# Patient Record
Sex: MALE | Race: WHITE
[De-identification: names, ages, dates, MRNs, and addresses within clinical notes are randomized per-mention and may not be internally consistent; named-entity substitution may affect disease eponyms.]

---

## 2017-12-23 ENCOUNTER — HOSPITAL ENCOUNTER (INPATIENT)
Dept: HOSPITAL 17 - HIME | Age: 82
LOS: 4 days | Discharge: NURSING, REHAB OR LONG-TERM CARE | DRG: 312 | End: 2017-12-27
Attending: HOSPITALIST | Admitting: HOSPITALIST
Payer: MEDICARE

## 2017-12-23 VITALS
HEART RATE: 74 BPM | SYSTOLIC BLOOD PRESSURE: 106 MMHG | RESPIRATION RATE: 13 BRPM | OXYGEN SATURATION: 97 % | DIASTOLIC BLOOD PRESSURE: 54 MMHG

## 2017-12-23 VITALS
DIASTOLIC BLOOD PRESSURE: 69 MMHG | SYSTOLIC BLOOD PRESSURE: 144 MMHG | HEART RATE: 82 BPM | RESPIRATION RATE: 24 BRPM | OXYGEN SATURATION: 83 %

## 2017-12-23 VITALS — OXYGEN SATURATION: 97 %

## 2017-12-23 VITALS — HEART RATE: 83 BPM

## 2017-12-23 VITALS — WEIGHT: 230.82 LBS | BODY MASS INDEX: 33.05 KG/M2 | HEIGHT: 70 IN

## 2017-12-23 VITALS
DIASTOLIC BLOOD PRESSURE: 59 MMHG | OXYGEN SATURATION: 95 % | SYSTOLIC BLOOD PRESSURE: 130 MMHG | RESPIRATION RATE: 12 BRPM | HEART RATE: 68 BPM | TEMPERATURE: 97.8 F

## 2017-12-23 VITALS
HEART RATE: 72 BPM | SYSTOLIC BLOOD PRESSURE: 135 MMHG | OXYGEN SATURATION: 100 % | RESPIRATION RATE: 16 BRPM | DIASTOLIC BLOOD PRESSURE: 62 MMHG

## 2017-12-23 VITALS
DIASTOLIC BLOOD PRESSURE: 57 MMHG | HEART RATE: 82 BPM | RESPIRATION RATE: 16 BRPM | SYSTOLIC BLOOD PRESSURE: 118 MMHG | OXYGEN SATURATION: 96 % | TEMPERATURE: 99.2 F

## 2017-12-23 VITALS
SYSTOLIC BLOOD PRESSURE: 139 MMHG | HEART RATE: 77 BPM | DIASTOLIC BLOOD PRESSURE: 74 MMHG | OXYGEN SATURATION: 100 % | RESPIRATION RATE: 15 BRPM

## 2017-12-23 VITALS
SYSTOLIC BLOOD PRESSURE: 106 MMHG | RESPIRATION RATE: 16 BRPM | OXYGEN SATURATION: 100 % | DIASTOLIC BLOOD PRESSURE: 54 MMHG | HEART RATE: 78 BPM

## 2017-12-23 VITALS
HEART RATE: 72 BPM | DIASTOLIC BLOOD PRESSURE: 66 MMHG | RESPIRATION RATE: 10 BRPM | SYSTOLIC BLOOD PRESSURE: 156 MMHG | OXYGEN SATURATION: 100 %

## 2017-12-23 VITALS
OXYGEN SATURATION: 92 % | DIASTOLIC BLOOD PRESSURE: 59 MMHG | SYSTOLIC BLOOD PRESSURE: 120 MMHG | RESPIRATION RATE: 13 BRPM | HEART RATE: 72 BPM

## 2017-12-23 VITALS
DIASTOLIC BLOOD PRESSURE: 74 MMHG | RESPIRATION RATE: 15 BRPM | OXYGEN SATURATION: 100 % | SYSTOLIC BLOOD PRESSURE: 162 MMHG | HEART RATE: 77 BPM

## 2017-12-23 VITALS — OXYGEN SATURATION: 99 %

## 2017-12-23 DIAGNOSIS — E03.9: ICD-10-CM

## 2017-12-23 DIAGNOSIS — I10: ICD-10-CM

## 2017-12-23 DIAGNOSIS — R53.1: ICD-10-CM

## 2017-12-23 DIAGNOSIS — Z79.02: ICD-10-CM

## 2017-12-23 DIAGNOSIS — E11.9: ICD-10-CM

## 2017-12-23 DIAGNOSIS — R29.6: ICD-10-CM

## 2017-12-23 DIAGNOSIS — R05: ICD-10-CM

## 2017-12-23 DIAGNOSIS — I49.3: ICD-10-CM

## 2017-12-23 DIAGNOSIS — M06.9: ICD-10-CM

## 2017-12-23 DIAGNOSIS — E78.5: ICD-10-CM

## 2017-12-23 DIAGNOSIS — Z79.4: ICD-10-CM

## 2017-12-23 DIAGNOSIS — Z87.891: ICD-10-CM

## 2017-12-23 DIAGNOSIS — Z95.1: ICD-10-CM

## 2017-12-23 DIAGNOSIS — I25.10: ICD-10-CM

## 2017-12-23 DIAGNOSIS — Z79.82: ICD-10-CM

## 2017-12-23 DIAGNOSIS — Z85.46: ICD-10-CM

## 2017-12-23 DIAGNOSIS — R55: Primary | ICD-10-CM

## 2017-12-23 LAB
ALBUMIN SERPL-MCNC: 2.6 GM/DL (ref 3.4–5)
ALP SERPL-CCNC: 84 U/L (ref 45–117)
ALT SERPL-CCNC: 64 U/L (ref 12–78)
AST SERPL-CCNC: 76 U/L (ref 15–37)
BASOPHILS # BLD AUTO: 0 TH/MM3 (ref 0–0.2)
BASOPHILS NFR BLD: 0.3 % (ref 0–2)
BILIRUB SERPL-MCNC: 0.7 MG/DL (ref 0.2–1)
BUN SERPL-MCNC: 22 MG/DL (ref 7–18)
CALCIUM SERPL-MCNC: 7.9 MG/DL (ref 8.5–10.1)
CHLORIDE SERPL-SCNC: 103 MEQ/L (ref 98–107)
CREAT SERPL-MCNC: 1.24 MG/DL (ref 0.6–1.3)
EOSINOPHIL # BLD: 0.1 TH/MM3 (ref 0–0.4)
EOSINOPHIL NFR BLD: 2.2 % (ref 0–4)
ERYTHROCYTE [DISTWIDTH] IN BLOOD BY AUTOMATED COUNT: 16.6 % (ref 11.6–17.2)
GFR SERPLBLD BASED ON 1.73 SQ M-ARVRAT: 55 ML/MIN (ref 89–?)
GLUCOSE SERPL-MCNC: 178 MG/DL (ref 74–106)
HCO3 BLD-SCNC: 30.1 MEQ/L (ref 21–32)
HCT VFR BLD CALC: 30.5 % (ref 39–51)
HGB BLD-MCNC: 10.4 GM/DL (ref 13–17)
INR PPP: 1.1 RATIO
LYMPHOCYTES # BLD AUTO: 0.9 TH/MM3 (ref 1–4.8)
LYMPHOCYTES NFR BLD AUTO: 13.6 % (ref 9–44)
MAGNESIUM SERPL-MCNC: 2 MG/DL (ref 1.5–2.5)
MCH RBC QN AUTO: 31.5 PG (ref 27–34)
MCHC RBC AUTO-ENTMCNC: 34.1 % (ref 32–36)
MCV RBC AUTO: 92.3 FL (ref 80–100)
MONOCYTE #: 0.2 TH/MM3 (ref 0–0.9)
MONOCYTES NFR BLD: 3.3 % (ref 0–8)
NEUTROPHILS # BLD AUTO: 5.4 TH/MM3 (ref 1.8–7.7)
NEUTROPHILS NFR BLD AUTO: 80.6 % (ref 16–70)
PHOSPHATE SERPL-MCNC: 1.6 MG/DL (ref 2.5–4.9)
PLATELET # BLD: 146 TH/MM3 (ref 150–450)
PMV BLD AUTO: 8 FL (ref 7–11)
PROT SERPL-MCNC: 6 GM/DL (ref 6.4–8.2)
PROTHROMBIN TIME: 10.9 SEC (ref 9.8–11.6)
RBC # BLD AUTO: 3.31 MIL/MM3 (ref 4.5–5.9)
SODIUM SERPL-SCNC: 139 MEQ/L (ref 136–145)
TROPONIN I SERPL-MCNC: 0.04 NG/ML (ref 0.02–0.05)
WBC # BLD AUTO: 6.7 TH/MM3 (ref 4–11)

## 2017-12-23 PROCEDURE — 87641 MR-STAPH DNA AMP PROBE: CPT

## 2017-12-23 PROCEDURE — 93880 EXTRACRANIAL BILAT STUDY: CPT

## 2017-12-23 PROCEDURE — 85610 PROTHROMBIN TIME: CPT

## 2017-12-23 PROCEDURE — 70450 CT HEAD/BRAIN W/O DYE: CPT

## 2017-12-23 PROCEDURE — 84155 ASSAY OF PROTEIN SERUM: CPT

## 2017-12-23 PROCEDURE — 80048 BASIC METABOLIC PNL TOTAL CA: CPT

## 2017-12-23 PROCEDURE — 94664 DEMO&/EVAL PT USE INHALER: CPT

## 2017-12-23 PROCEDURE — 80053 COMPREHEN METABOLIC PANEL: CPT

## 2017-12-23 PROCEDURE — 93306 TTE W/DOPPLER COMPLETE: CPT

## 2017-12-23 PROCEDURE — 93005 ELECTROCARDIOGRAM TRACING: CPT

## 2017-12-23 PROCEDURE — 71010: CPT

## 2017-12-23 PROCEDURE — 84443 ASSAY THYROID STIM HORMONE: CPT

## 2017-12-23 PROCEDURE — 82550 ASSAY OF CK (CPK): CPT

## 2017-12-23 PROCEDURE — 82948 REAGENT STRIP/BLOOD GLUCOSE: CPT

## 2017-12-23 PROCEDURE — 85025 COMPLETE CBC W/AUTO DIFF WBC: CPT

## 2017-12-23 PROCEDURE — 84484 ASSAY OF TROPONIN QUANT: CPT

## 2017-12-23 PROCEDURE — 94640 AIRWAY INHALATION TREATMENT: CPT

## 2017-12-23 PROCEDURE — 84100 ASSAY OF PHOSPHORUS: CPT

## 2017-12-23 PROCEDURE — 83735 ASSAY OF MAGNESIUM: CPT

## 2017-12-23 RX ADMIN — HUMAN INSULIN SCH: 100 INJECTION, SOLUTION SUBCUTANEOUS at 12:00

## 2017-12-23 RX ADMIN — PHENYTOIN SODIUM SCH MLS/HR: 50 INJECTION INTRAMUSCULAR; INTRAVENOUS at 11:30

## 2017-12-23 RX ADMIN — IPRATROPIUM BROMIDE AND ALBUTEROL SULFATE SCH AMPULE: .5; 3 SOLUTION RESPIRATORY (INHALATION) at 15:31

## 2017-12-23 RX ADMIN — HEPARIN SODIUM SCH UNITS: 10000 INJECTION, SOLUTION INTRAVENOUS; SUBCUTANEOUS at 22:15

## 2017-12-23 RX ADMIN — STANDARDIZED SENNA CONCENTRATE AND DOCUSATE SODIUM SCH TAB: 8.6; 5 TABLET, FILM COATED ORAL at 22:14

## 2017-12-23 RX ADMIN — FAMOTIDINE SCH MG: 20 TABLET, FILM COATED ORAL at 22:14

## 2017-12-23 RX ADMIN — HUMAN INSULIN SCH: 100 INJECTION, SOLUTION SUBCUTANEOUS at 20:00

## 2017-12-23 RX ADMIN — IPRATROPIUM BROMIDE AND ALBUTEROL SULFATE SCH AMPULE: .5; 3 SOLUTION RESPIRATORY (INHALATION) at 11:15

## 2017-12-23 RX ADMIN — IPRATROPIUM BROMIDE AND ALBUTEROL SULFATE SCH AMPULE: .5; 3 SOLUTION RESPIRATORY (INHALATION) at 19:37

## 2017-12-23 NOTE — RADRPT
EXAM DATE/TIME:  12/23/2017 00:00 

 

[facility] COMPARISON:     

No previous studies available for comparison.

 

                     

INDICATIONS :     

Shortness of breath.

                     

 

MEDICAL HISTORY :     

None.          

 

SURGICAL HISTORY :     

CABG.   

 

ENCOUNTER:     

Initial                                        

 

ACUITY:     

1 day      

 

PAIN SCORE:     

Non-responsive.

 

LOCATION:     

Bilateral chest 

 

FINDINGS:     

Moderate elevation right hemidiaphragm.  Minimal bibasilar probable changes.  Mild compensated cardio
megaly.  History of previous bypass.

 

CONCLUSION:     

Bibasilar parenchymal changes with elevation right hemidiaphragm otherwise negative.

 

 

 

 Thien Talavera MD FACR on December 23, 2017 at 11:49           

Board Certified Radiologist.

 This report was verified electronically.

## 2017-12-23 NOTE — MH
cc:

MOSES HUMPHRIES M.D.

****

 

DATE OF ADMISSION:  12/23/2017

 

HISTORY OF PRESENT ILLNESS:

The patient is an 89-year-old male with past medical history of coronary artery

disease, previous CABG, hypertension, diabetes mellitus, hyperlipidemia,

prostate cancer, hypothyroidism and rheumatoid arthritis.  The patient was

initially hospitalized at [facility] for generalized weakness,

frequent falls and difficulty ambulating.  During his hospital stay, he was

seen by a neurologist and had a CT scan of the brain which showed no acute

intracranial findings. The patient was transferred to [facility] for therapy

yesterday.  According to his medical records, the patient was noted to have

bradycardia at the outside facility.  He also was evaluated by cardiology at

[place] for syncope, which was thought secondary to beta-blocker and it was

discontinued. A HaliCAT was called as the patient looked ashen and had

questionable syncopal episode.  He was placed back in bed where he improved

spontaneously.  The patient was transferred to the intensive care unit for

close observation.

 

When seen, he is on room air oxygen with a pulse of 69, blood pressure 130/59,

saturation 95% on room air. A Code Blue was called in [facility]; however,

the patient did not receive any medications or CPR.

 

PAST MEDICAL HISTORY:

His past medical history is significant for:

1.  Hypertension.

2. Diabetes.

3. Hyperlipidemia.

4. Coronary artery disease.

5. Hypothyroidism.

6. Prostate cancer.

7. Rheumatoid arthritis.

 

PAST SURGICAL HISTORY:

1. Previous kyphoplasty seven or eight years ago.

2. Previous CABG.

3. Previous catheterization.

4. Stent placement x5.

5. Previous abdominal surgery.

 

ALLERGIES:

NO KNOWN DRUG ALLERGIES.

 

SOCIAL HISTORY:

Remote history of tobacco use.

 

CURRENT MEDICATIONS:

1. Aspirin.

2. Plavix.

3. Lipitor.

4. Boniva.

5. Insulin.

6. Synthroid.

 

FAMILY HISTORY:

Noncontributory to the present illness.

 

REVIEW OF SYSTEMS:

The review of systems is as per the history of present illness, and the rest of

the review of systems is unremarkable.

 

PHYSICAL EXAMINATION:

GENERAL:  An 89-year-old male lying in bed in no acute distress on room air

oxygen.

VITAL SIGNS: Afebrile, pulse of 69, blood pressure 138/59,  saturation 95% on

room air.

HEAD, EYES, EARS, NOSE, THROAT: Normocephalic and atraumatic. Pupils equal,

round and reactive to light and accommodation.  Extraocular muscles intact.

Conjunctivae are pink. Nonicteric sclerae. Oral mucosa within normal limits.

NECK: The neck is supple. No jugular venous distention, adenopathy or

thyromegaly. Trachea in the midline.

CARDIOVASCULAR: Regular rate and rhythm. Normal S1-S2. No murmurs, rubs or

gallops noted.

PULMONARY: Bilateral equal air entry. No rales or wheezing.

ABDOMEN: The abdomen is soft, obese, nontender and no distention. Positive

bowel sounds.

EXTREMITIES: No cyanosis, clubbing or edema.

NEUROLOGIC: No focal sensory deficit. Awake and alert.

 

LABORATORY DATA:

None available.

 

IMPRESSION:

1. Status post syncopal episode.

2. Generalized weakness and frequent falls.

3. History of coronary artery disease and CABG.

4. Hypertension.

5. Diabetes mellitus.

6. Hyperlipidemia.

7. Hypothyroidism.

8. History of prostate cancer.

9. Rheumatoid arthritis.

 

RECOMMENDATIONS:

1. Monitor neuro status closely and avoid any sedatives.

2. I will proceed with CT scan of the brain without contrast. In addition, will

   obtain a Doppler ultrasound of the carotid arteries.

3. Monitor heart rate and blood pressure closely and maintain MAP greater than

   65 mmHg.

4. Continue with aspirin 81 milligrams daily and Plavix 75  milligrams daily.

5. Avoid beta blocker or calcium channel blocker as the patient has a history

   of bradycardia, which was thought to be related to a beta blocker at [facility]..

6. Will obtain cardiac enzymes with troponin and EKG.

7. Will check 2D echocardiogram to evaluate left ventricular function and to

   rule out regional wall motion abnormalities.

8. Continue oxygen PRN to maintain saturations above 92%.

9. Bronchodilators in the form of DuoNeb q. 6 plus q. 2 PRN for shortness of

   breath.

10. Monitor  renal function and electrolyte replacement as needed. Place on

   normal saline at 75 mL/hour.

11. Place on Pepcid for  GI prophylaxis. Start Heart healthy diet as ilene.

13. Monitor for signs of infection, which include fever and WBCs. Pan culture

   if spikes fever.  Will check a baseline chest x-ray.

14. Monitor CBC and continue with folic acid 1 milligram daily.

15. Place on sliding scale insulin with Accu-Chek for glycemic control.

16. Resume Synthroid at 25 micrograms daily,  his home medication.

17. Check a baseline TSH level.

18. GI prophylaxis with Pepcid and DVT prophylaxis with SCDs and start heparin

   subcutaneous for DVT prophylaxis if CT brain is negative for acute findings.

 

Case was discussed with the nursing staff and with the patient's son, who

is present at the bedside.

 

 

 

                               __________________________________

                           MD GANGA Vick/KERRIE

D:  12/23/2017/10:45 AM

T:  12/23/2017/10:59 AM

Visit #:  [id number]

Job #:  [id number]

YOSVANY

## 2017-12-23 NOTE — RADRPT
EXAM DATE/TIME:  12/23/2017 14:23 

 

[facility] COMPARISON:     

No previous studies available for comparison.

        

 

INDICATIONS :     Syncope. 

                     

MEDICAL HISTORY :     Stroke. Congestive heart failure. Hypertension. Thyroid disease. Cardiomyopathy
. COPD. Dyspnea. Arthritis. Diabetes. Prostate cancer. 

SURGICAL HISTORY :          Orthopedic surgery. 

ENCOUNTER:     Initial

ACUITY:     1 day

PAIN SCORE:     2/10

LOCATION:     Bilateral neck 

                     

PEAK SYSTOLIC VELOCITIES (cm/sec):

ICA/CCA RATIO:                    Right: 1.1     Left: 1.1

ICA:                          Right: 83.8     Left: 80.6

CCA:                          Right: 76.5     Left: 72.9

ECA:                           Right: 103.2     Left: 123.9

VERTEBRAL:           Right: 54.8 antegrade     Left: 58.0 antegrade

             

Elevated flow velocities and ICA/CCA ratios have been found to correlate with increased degrees of

vessel stenosis, calculated as percentage of diameter relative to a normal segment of distal ICA/CCA

 

FINDINGS:     

RIGHT CAROTID:  There is no evidence for a hemodynamically significant carotid stenosis.  Minimal int
imal hyperplasia is present with scattered calcific plaque.

LEFT CAROTID:  There is no evidence for a hemodynamically significant carotid stenosis.  Minimal inti
mal hyperplasia is present with scattered calcific plaque.

VERTEBRAL ARTERIES: Flow is antegrade in both vertebral arteries.

MISCELLANEOUS: There are no ancillary masses or adenopathy.

 

CONCLUSION:  Negative examination for a hemodynamically significant carotid stenosis.

 

     

Board Certified Radiologist.

 This report was verified electronically.

## 2017-12-23 NOTE — RADRPT
EXAM DATE/TIME:  12/23/2017 11:31 

 

[facility] COMPARISON:     

No previous studies available for comparison.

 

 

INDICATIONS :     

Syncopal episode.

                      

 

RADIATION DOSE:     

32.86 CTDIvol (mGy) moderate motion artifact requiring repeating multiple slices.

 

 

 

MEDICAL HISTORY :     

Stroke. Congestive heart failure. Hypertension.Diabetes, prostate cancer.

 

SURGICAL HISTORY :      

None. 

 

ENCOUNTER:      

Initial

 

ACUITY:      

1 day

 

PAIN SCALE:      

2/10

 

LOCATION:       

Bilateral  head

 

TECHNIQUE:     

Multiple contiguous axial images were obtained of the head.  Using automated exposure control and adj
ustment of the mA and/or kV according to patient size, radiation dose was kept as low as reasonably a
chievable to obtain optimal diagnostic quality images.   DICOM format image data is available electro
nically for review and comparison.  

 

FINDINGS:     

There is marked central and cortical atrophy with dilatation of ventricular and sulcal spaces.  There
 is no parenchymal hemorrhage, acute infarction or mass lesion identified.  There are no extra-axial 
fluid collections appreciated.  The posterior fossa is unremarkable with midline fourth ventricle.  T
he portion of the orbits and paranasal sinuses visualized are unremarkable. 

 

CONCLUSION:     

Moderate motion artifact, atrophy, negative for acute process..

 

 

 

 Thien Talavera MD FACR on December 23, 2017 at 11:45           

Board Certified Radiologist.

 This report was verified electronically.

## 2017-12-24 VITALS
HEART RATE: 62 BPM | DIASTOLIC BLOOD PRESSURE: 65 MMHG | TEMPERATURE: 98.3 F | SYSTOLIC BLOOD PRESSURE: 154 MMHG | OXYGEN SATURATION: 98 % | RESPIRATION RATE: 15 BRPM

## 2017-12-24 VITALS
RESPIRATION RATE: 20 BRPM | DIASTOLIC BLOOD PRESSURE: 89 MMHG | HEART RATE: 93 BPM | SYSTOLIC BLOOD PRESSURE: 134 MMHG | OXYGEN SATURATION: 99 % | TEMPERATURE: 101 F

## 2017-12-24 VITALS
DIASTOLIC BLOOD PRESSURE: 72 MMHG | SYSTOLIC BLOOD PRESSURE: 159 MMHG | OXYGEN SATURATION: 94 % | RESPIRATION RATE: 20 BRPM | HEART RATE: 84 BPM | TEMPERATURE: 98.5 F

## 2017-12-24 VITALS
RESPIRATION RATE: 16 BRPM | HEART RATE: 67 BPM | DIASTOLIC BLOOD PRESSURE: 68 MMHG | OXYGEN SATURATION: 96 % | SYSTOLIC BLOOD PRESSURE: 146 MMHG | TEMPERATURE: 97.8 F

## 2017-12-24 VITALS
OXYGEN SATURATION: 98 % | TEMPERATURE: 98.2 F | RESPIRATION RATE: 22 BRPM | DIASTOLIC BLOOD PRESSURE: 61 MMHG | HEART RATE: 77 BPM | SYSTOLIC BLOOD PRESSURE: 131 MMHG

## 2017-12-24 VITALS
TEMPERATURE: 99 F | OXYGEN SATURATION: 96 % | RESPIRATION RATE: 14 BRPM | SYSTOLIC BLOOD PRESSURE: 135 MMHG | HEART RATE: 89 BPM | DIASTOLIC BLOOD PRESSURE: 65 MMHG

## 2017-12-24 VITALS — HEART RATE: 77 BPM

## 2017-12-24 VITALS — HEART RATE: 89 BPM

## 2017-12-24 VITALS — HEART RATE: 75 BPM

## 2017-12-24 VITALS — HEART RATE: 106 BPM

## 2017-12-24 VITALS — HEART RATE: 67 BPM

## 2017-12-24 VITALS — HEART RATE: 80 BPM

## 2017-12-24 VITALS — OXYGEN SATURATION: 98 %

## 2017-12-24 LAB
BASOPHILS # BLD AUTO: 0 TH/MM3 (ref 0–0.2)
BASOPHILS NFR BLD: 0.3 % (ref 0–2)
BUN SERPL-MCNC: 20 MG/DL (ref 7–18)
CALCIUM SERPL-MCNC: 7.6 MG/DL (ref 8.5–10.1)
CHLORIDE SERPL-SCNC: 105 MEQ/L (ref 98–107)
CREAT SERPL-MCNC: 1.07 MG/DL (ref 0.6–1.3)
EOSINOPHIL # BLD: 0.2 TH/MM3 (ref 0–0.4)
EOSINOPHIL NFR BLD: 4 % (ref 0–4)
ERYTHROCYTE [DISTWIDTH] IN BLOOD BY AUTOMATED COUNT: 17 % (ref 11.6–17.2)
GFR SERPLBLD BASED ON 1.73 SQ M-ARVRAT: 65 ML/MIN (ref 89–?)
GLUCOSE SERPL-MCNC: 148 MG/DL (ref 74–106)
HCO3 BLD-SCNC: 30.5 MEQ/L (ref 21–32)
HCT VFR BLD CALC: 28.4 % (ref 39–51)
HGB BLD-MCNC: 9.3 GM/DL (ref 13–17)
LYMPHOCYTES # BLD AUTO: 1.1 TH/MM3 (ref 1–4.8)
LYMPHOCYTES NFR BLD AUTO: 24.6 % (ref 9–44)
MAGNESIUM SERPL-MCNC: 2 MG/DL (ref 1.5–2.5)
MCH RBC QN AUTO: 30.5 PG (ref 27–34)
MCHC RBC AUTO-ENTMCNC: 32.8 % (ref 32–36)
MCV RBC AUTO: 93 FL (ref 80–100)
MONOCYTE #: 0.2 TH/MM3 (ref 0–0.9)
MONOCYTES NFR BLD: 3.5 % (ref 0–8)
NEUTROPHILS # BLD AUTO: 3.1 TH/MM3 (ref 1.8–7.7)
NEUTROPHILS NFR BLD AUTO: 67.6 % (ref 16–70)
PHOSPHATE SERPL-MCNC: 2.2 MG/DL (ref 2.5–4.9)
PLATELET # BLD: 146 TH/MM3 (ref 150–450)
PMV BLD AUTO: 8.2 FL (ref 7–11)
RBC # BLD AUTO: 3.06 MIL/MM3 (ref 4.5–5.9)
SODIUM SERPL-SCNC: 139 MEQ/L (ref 136–145)
WBC # BLD AUTO: 4.5 TH/MM3 (ref 4–11)

## 2017-12-24 RX ADMIN — HEPARIN SODIUM SCH UNITS: 10000 INJECTION, SOLUTION INTRAVENOUS; SUBCUTANEOUS at 09:20

## 2017-12-24 RX ADMIN — HUMAN INSULIN SCH: 100 INJECTION, SOLUTION SUBCUTANEOUS at 20:56

## 2017-12-24 RX ADMIN — IPRATROPIUM BROMIDE AND ALBUTEROL SULFATE SCH AMPULE: .5; 3 SOLUTION RESPIRATORY (INHALATION) at 03:16

## 2017-12-24 RX ADMIN — IPRATROPIUM BROMIDE AND ALBUTEROL SULFATE SCH AMPULE: .5; 3 SOLUTION RESPIRATORY (INHALATION) at 08:03

## 2017-12-24 RX ADMIN — CHLORHEXIDINE GLUCONATE SCH PACK: 500 CLOTH TOPICAL at 04:00

## 2017-12-24 RX ADMIN — FAMOTIDINE SCH MG: 20 TABLET, FILM COATED ORAL at 20:57

## 2017-12-24 RX ADMIN — IPRATROPIUM BROMIDE AND ALBUTEROL SULFATE SCH AMPULE: .5; 3 SOLUTION RESPIRATORY (INHALATION) at 20:30

## 2017-12-24 RX ADMIN — HUMAN INSULIN SCH: 100 INJECTION, SOLUTION SUBCUTANEOUS at 04:00

## 2017-12-24 RX ADMIN — HEPARIN SODIUM SCH UNITS: 10000 INJECTION, SOLUTION INTRAVENOUS; SUBCUTANEOUS at 20:57

## 2017-12-24 RX ADMIN — STANDARDIZED SENNA CONCENTRATE AND DOCUSATE SODIUM SCH TAB: 8.6; 5 TABLET, FILM COATED ORAL at 20:57

## 2017-12-24 RX ADMIN — IPRATROPIUM BROMIDE AND ALBUTEROL SULFATE SCH AMPULE: .5; 3 SOLUTION RESPIRATORY (INHALATION) at 12:00

## 2017-12-24 RX ADMIN — FAMOTIDINE SCH MG: 20 TABLET, FILM COATED ORAL at 09:21

## 2017-12-24 RX ADMIN — CLOPIDOGREL BISULFATE SCH MG: 75 TABLET, FILM COATED ORAL at 09:21

## 2017-12-24 RX ADMIN — IPRATROPIUM BROMIDE AND ALBUTEROL SULFATE SCH AMPULE: .5; 3 SOLUTION RESPIRATORY (INHALATION) at 00:00

## 2017-12-24 RX ADMIN — PHENYTOIN SODIUM SCH MLS/HR: 50 INJECTION INTRAMUSCULAR; INTRAVENOUS at 04:42

## 2017-12-24 RX ADMIN — LEVOTHYROXINE SODIUM SCH MCG: 25 TABLET ORAL at 05:59

## 2017-12-24 RX ADMIN — ASPIRIN SCH MG: 81 TABLET ORAL at 09:20

## 2017-12-24 RX ADMIN — IPRATROPIUM BROMIDE AND ALBUTEROL SULFATE SCH AMPULE: .5; 3 SOLUTION RESPIRATORY (INHALATION) at 16:39

## 2017-12-24 RX ADMIN — HUMAN INSULIN SCH: 100 INJECTION, SOLUTION SUBCUTANEOUS at 16:00

## 2017-12-24 RX ADMIN — HUMAN INSULIN SCH: 100 INJECTION, SOLUTION SUBCUTANEOUS at 12:00

## 2017-12-24 RX ADMIN — STANDARDIZED SENNA CONCENTRATE AND DOCUSATE SODIUM SCH TAB: 8.6; 5 TABLET, FILM COATED ORAL at 09:21

## 2017-12-24 RX ADMIN — HUMAN INSULIN SCH: 100 INJECTION, SOLUTION SUBCUTANEOUS at 08:00

## 2017-12-24 RX ADMIN — FOLIC ACID SCH MG: 1 TABLET ORAL at 09:21

## 2017-12-24 RX ADMIN — HUMAN INSULIN SCH: 100 INJECTION, SOLUTION SUBCUTANEOUS at 00:00

## 2017-12-24 NOTE — HHI.CCPN
Subjective


Remarks/Hospital Course


Patient is an 89-year-old male with past medical history of coronary 

arterydisease, previous CABG, hypertension, diabetes mellitus, hyperlipidemia, 

prostate cancer, hypothyroidism and rheumatoid arthritis.  The patient was 

initially hospitalized at [facility] for generalized weakness,


frequent falls and difficulty ambulating.  During his hospital stay, he was 

seen by a neurologist and had a CT scan of the brain which showed no acute 

intracranial findings. The patient was transferred to [facility] for therapy 

yesterday.  According to his medical records, the patient was noted to have 

bradycardia at the outside facility.  He also was evaluated by cardiology at 

[place] for syncope, which was thought secondary to beta-blocker and it was 

discontinued. A HaliCAT was called as the patient looked ashen and had 

questionable syncopal episode.  He was placed back in bed where he improved 

spontaneously.  The patient was transferred to the intensive care unit for 

close observation. When seen, he is on room air oxygen with a pulse of 69, 

blood pressure 130/59, saturation 95% on room air. A Code Blue was called in 

[facility]; however, the patient did not receive any medications or CPR.





12/24 No events overnight. Patient is lying in bed in NAD. Afebrile.





Objective





Vital Signs








  Date Time  Temp Pulse Resp B/P (MAP) Pulse Ox O2 Delivery O2 Flow Rate FiO2


 


12/24/17 06:00  67      


 


12/24/17 04:00 97.8  16 146/68 (94) 96   


 


12/23/17 19:38      Nasal Cannula 3.00 














Intake and Output   


 


 12/24/17 12/24/17 12/25/17





 08:00 16:00 00:00


 


Intake Total 1709 ml  


 


Output Total 500 ml  


 


Balance 1209 ml  








Result Diagram:  


12/24/17 0347                                                                  

              12/24/17 0347





Other Results





Laboratory Tests








Test


  12/23/17


12:05 12/23/17


14:20 12/24/17


03:47


 


White Blood Count 6.7 TH/MM3   4.5 TH/MM3 


 


Red Blood Count 3.31 MIL/MM3   3.06 MIL/MM3 


 


Hemoglobin 10.4 GM/DL   9.3 GM/DL 


 


Hematocrit 30.5 %   28.4 % 


 


Mean Corpuscular Volume 92.3 FL   93.0 FL 


 


Mean Corpuscular Hemoglobin 31.5 PG   30.5 PG 


 


Mean Corpuscular Hemoglobin


Concent 34.1 % 


  


  32.8 % 


 


 


Red Cell Distribution Width 16.6 %   17.0 % 


 


Platelet Count 146 TH/MM3   146 TH/MM3 


 


Mean Platelet Volume 8.0 FL   8.2 FL 


 


Neutrophils (%) (Auto) 80.6 %   67.6 % 


 


Lymphocytes (%) (Auto) 13.6 %   24.6 % 


 


Monocytes (%) (Auto) 3.3 %   3.5 % 


 


Eosinophils (%) (Auto) 2.2 %   4.0 % 


 


Basophils (%) (Auto) 0.3 %   0.3 % 


 


Neutrophils # (Auto) 5.4 TH/MM3   3.1 TH/MM3 


 


Lymphocytes # (Auto) 0.9 TH/MM3   1.1 TH/MM3 


 


Monocytes # (Auto) 0.2 TH/MM3   0.2 TH/MM3 


 


Eosinophils # (Auto) 0.1 TH/MM3   0.2 TH/MM3 


 


Basophils # (Auto) 0.0 TH/MM3   0.0 TH/MM3 


 


CBC Comment DIFF FINAL   DIFF FINAL 


 


Differential Comment     


 


Prothrombin Time 10.9 SEC   


 


Prothromb Time International


Ratio 1.1 RATIO 


  


  


 


 


Blood Urea Nitrogen 22 MG/DL   20 MG/DL 


 


Creatinine 1.24 MG/DL   1.07 MG/DL 


 


Random Glucose 178 MG/DL   148 MG/DL 


 


Total Protein 6.0 GM/DL   


 


Albumin 2.6 GM/DL   


 


Calcium Level 7.9 MG/DL   7.6 MG/DL 


 


Phosphorus Level 1.6 MG/DL   2.2 MG/DL 


 


Magnesium Level 2.0 MG/DL   2.0 MG/DL 


 


Alkaline Phosphatase 84 U/L   


 


Aspartate Amino Transf


(AST/SGOT) 76 U/L 


  


  


 


 


Alanine Aminotransferase


(ALT/SGPT) 64 U/L 


  


  


 


 


Total Bilirubin 0.7 MG/DL   


 


Sodium Level 139 MEQ/L   139 MEQ/L 


 


Potassium Level 3.7 MEQ/L   3.6 MEQ/L 


 


Chloride Level 103 MEQ/L   105 MEQ/L 


 


Carbon Dioxide Level 30.1 MEQ/L   30.5 MEQ/L 


 


Anion Gap 6 MEQ/L   4 MEQ/L 


 


Estimat Glomerular Filtration


Rate 55 ML/MIN 


  


  65 ML/MIN 


 


 


Total Creatine Kinase 30 U/L   


 


Troponin I 0.04 NG/ML   


 


Thyroid Stimulating Hormone


3rd Gen 3.510 uIU/ML 


  


  


 


 


Nasal Screen MRSA (PCR)


  


  MRSA NOT


DETECTED 


 








Imaging





Last Impressions








Head CT 12/23/17 0000 Signed





Impressions: 





 Service Date/Time:  Saturday, December 23, 2017 11:31 - CONCLUSION:  Moderate 





 motion artifact, atrophy, negative for acute process..     Thien Talavera MD

  





 FACR


 


Chest X-Ray 12/23/17 0000 Signed





Impressions: 





 Service Date/Time:  Saturday, December 23, 2017 00:00 - CONCLUSION:  Bibasilar 





 parenchymal changes with elevation right hemidiaphragm otherwise negative.     





 Thien Talavera MD  FACR


 


Carotid Artery Ultrasound 12/23/17 0000 Signed





Impressions: 





 Service Date/Time:  Saturday, December 23, 2017 14:23 - CONCLUSION:   Negative 





 examination for a hemodynamically significant carotid stenosis.        Board 





 Certified Radiologist.  This report was verified electronically.   MD 








Objective Remarks


GENERAL: Patient is 89 yo lying in bed in NAD


SKIN: Warm and dry.


HEAD: Normocephalic.


EYES: No scleral icterus. No injection or drainage. 


NECK: Supple, trachea midline. No JVD or lymphadenopathy.


CARDIOVASCULAR: Regular rate and rhythm without murmurs, gallops, or rubs. 


RESPIRATORY: Breath sounds equal bilaterally. No accessory muscle use.


GASTROINTESTINAL: Abdomen soft, non-tender, nondistended. 


MUSCULOSKELETAL: No cyanosis, or edema. 


Neuro: Awake








A/P


Assessment and Plan


1. Status post syncopal episode.


2. Generalized weakness and frequent falls.


3. History of coronary artery disease and CABG.


4. Hypertension.


5. Diabetes mellitus.


6. Hyperlipidemia.


7. Hypothyroidism.


8. History of prostate cancer.


9. Rheumatoid arthritis.


 


Plan





Neuro:  Monitor neuro status closely and avoid any sedatives.


            CT brain yesterday negative for acute process.


            Doppler US carotid arteries: Negative for hemodynamically 

significant carotid stenosis.





CV: Monitor HR and BP and maintain MAP> 65 mmHg.


      Continue with ASA 81 mg daily and Plavix 75  mg daily.


      Avoid beta blocker or calcium channel blocker as the patient has a 

history of bradycardia, which was thought to be related to a beta blocker at 

[facility]..


      Check 2D echo to evaluate left ventricular function 





Pulm: Continue oxygen PRN to maintain sats > 92%.


         Bronchodilators 





: Monitor  renal function and electrolyte replacement as needed. Will need 

Phos replacement today





GI: On Pepcid for  GI prophylaxis.  Heart healthy diet 





ID: Monitor for signs of infection( fever and WBCs). Pan culture if spikes 

fever.  








Endo: SSI with Accu-Chek for glycemic control.


         On Synthroid at 25 mcg daily,  TSH: 3.5





GI prophylaxis with Pepcid and DVT prophylaxis with SCDs/Heparin SQ





Level 2











Ajay Cabrera MD Dec 24, 2017 10:00

## 2017-12-24 NOTE — ECHRPT
Indication:   SYNCOPAL EPISODE

 

 CONCLUSIONS

 Normal left ventricular size. 

 Wall thickness is measured at the upper limits of normal. 

 The left ventricular systolic function is low normal with an estimated ejection fraction in the rang
e of 50-

 55%. 

 Mitral annular calcification is present. 

 Mild thickening of the mitral valve leaflets. 

 Mild mitral valve regurgitation. 

 Aortic valve sclerosis is present. 

 There is mild tricuspid valve regurgitation. 

 There is estimated moderate pulmonary hypertension present (55 mmHg). 

 

 

 BP:        /         HR:                          Rhythm:

 

 MEASUREMENTS  (Male / Female) Normal Values       Technical Quality:

 2D ECHO

 LV Diastolic Diameter PLAX        4.5 cm                4.2 - 5.9 / 3.9 - 5.3 cm

 LV Systolic Diameter PLAX         3.5 cm                

 IVS Diastolic Thickness           1.2 cm                0.6 - 1.0 / 0.6 - 0.9 cm

 LVPW Diastolic Thickness          0.7 cm                0.6 - 1.0 / 0.6 - 0.9 cm

 LV Relative Wall Thickness        0.4                   

 RV Internal Dim ED PLAX           2.6 cm                

 LA Systolic Diameter LX           3.7 cm                3.0 - 4.0 / 2.7 - 3.8 cm

 

 DOPPLER

 AV Peak Velocity                  132.0 cm/s            

 AV Peak Gradient                  7.0 mmHg              

 Mitral E Point Velocity           98.7 cm/s             

 Mitral A Point Velocity           103.0 cm/s            

 Mitral E to A Ratio               1.0                   

 TR Peak Velocity                  353.0 cm/s            

 TR Peak Gradient                  49.8 mmHg             

 

 

 FINDINGS

 

 LEFT VENTRICLE

 Normal left ventricular size. 

 Wall thickness is measured at the upper limits of normal. 

 The left ventricular systolic function is low normal with an estimated ejection fraction in the rang
e of 50-

 55%. 

 

 RIGHT VENTRICLE

 Normal right ventricular size and systolic function.  

 

 LEFT ATRIUM

 The left atrial size is normal.  

 

 RIGHT ATRIUM

 The right atrial size is normal.  

 

 ATRIAL SEPTUM

 Normal atrial septal thickness without atrial level shunting by limited color doppler interrogation.
  

 

 AORTA

 The aortic root and proximal ascending aorta are normal in size on limited imaging.  

 

 MITRAL VALVE

 Mitral annular calcification is present. 

 Mild thickening of the mitral valve leaflets. 

 Mild mitral valve regurgitation. 

 

 AORTIC VALVE

 Aortic valve sclerosis is present. 

 

 TRICUSPID VALVE

 There is mild tricuspid valve regurgitation. 

 There is estimated moderate pulmonary hypertension present (55 mmHg). 

 

 PULMONARY VALVE

 The pulmonary valve is not well visualized.  

 

 VESSELS

 The inferior vena cava is normal in size.  

 

 PERICARDIUM

 No pericardial effusion.  

 

 

 

 

  Maru Bucio MD, FACC

  (Electronically Signed)

  Final Date:24 December 2017 17:44

## 2017-12-25 VITALS — HEART RATE: 82 BPM

## 2017-12-25 VITALS
RESPIRATION RATE: 16 BRPM | DIASTOLIC BLOOD PRESSURE: 69 MMHG | SYSTOLIC BLOOD PRESSURE: 150 MMHG | OXYGEN SATURATION: 95 % | HEART RATE: 98 BPM | TEMPERATURE: 97.9 F

## 2017-12-25 VITALS
HEART RATE: 78 BPM | DIASTOLIC BLOOD PRESSURE: 79 MMHG | SYSTOLIC BLOOD PRESSURE: 164 MMHG | OXYGEN SATURATION: 97 % | TEMPERATURE: 97.5 F | RESPIRATION RATE: 19 BRPM

## 2017-12-25 VITALS
HEART RATE: 76 BPM | RESPIRATION RATE: 16 BRPM | SYSTOLIC BLOOD PRESSURE: 162 MMHG | OXYGEN SATURATION: 98 % | TEMPERATURE: 98.5 F | DIASTOLIC BLOOD PRESSURE: 75 MMHG

## 2017-12-25 VITALS
OXYGEN SATURATION: 98 % | SYSTOLIC BLOOD PRESSURE: 135 MMHG | TEMPERATURE: 99.2 F | HEART RATE: 82 BPM | RESPIRATION RATE: 18 BRPM | DIASTOLIC BLOOD PRESSURE: 65 MMHG

## 2017-12-25 VITALS
DIASTOLIC BLOOD PRESSURE: 68 MMHG | RESPIRATION RATE: 18 BRPM | SYSTOLIC BLOOD PRESSURE: 142 MMHG | HEART RATE: 76 BPM | TEMPERATURE: 97.9 F | OXYGEN SATURATION: 97 %

## 2017-12-25 VITALS — HEART RATE: 77 BPM

## 2017-12-25 VITALS — HEART RATE: 75 BPM

## 2017-12-25 VITALS
DIASTOLIC BLOOD PRESSURE: 59 MMHG | OXYGEN SATURATION: 93 % | TEMPERATURE: 98.2 F | RESPIRATION RATE: 16 BRPM | SYSTOLIC BLOOD PRESSURE: 127 MMHG | HEART RATE: 82 BPM

## 2017-12-25 VITALS
HEART RATE: 72 BPM | SYSTOLIC BLOOD PRESSURE: 168 MMHG | RESPIRATION RATE: 21 BRPM | DIASTOLIC BLOOD PRESSURE: 73 MMHG | OXYGEN SATURATION: 95 % | TEMPERATURE: 98.1 F

## 2017-12-25 VITALS — OXYGEN SATURATION: 99 %

## 2017-12-25 VITALS — OXYGEN SATURATION: 93 %

## 2017-12-25 VITALS — HEART RATE: 78 BPM

## 2017-12-25 VITALS — HEART RATE: 98 BPM

## 2017-12-25 VITALS — HEART RATE: 79 BPM

## 2017-12-25 VITALS — HEART RATE: 72 BPM

## 2017-12-25 LAB
BASOPHILS # BLD AUTO: 0 TH/MM3 (ref 0–0.2)
BASOPHILS NFR BLD: 0.2 % (ref 0–2)
BUN SERPL-MCNC: 19 MG/DL (ref 7–18)
CALCIUM SERPL-MCNC: 7.4 MG/DL (ref 8.5–10.1)
CALCIUM TP COR SERPL-MCNC: 8 MG/DL (ref 8.5–10.1)
CHLORIDE SERPL-SCNC: 107 MEQ/L (ref 98–107)
CREAT SERPL-MCNC: 0.81 MG/DL (ref 0.6–1.3)
EOSINOPHIL # BLD: 0.2 TH/MM3 (ref 0–0.4)
EOSINOPHIL NFR BLD: 4.4 % (ref 0–4)
ERYTHROCYTE [DISTWIDTH] IN BLOOD BY AUTOMATED COUNT: 16.5 % (ref 11.6–17.2)
GFR SERPLBLD BASED ON 1.73 SQ M-ARVRAT: 90 ML/MIN (ref 89–?)
GLUCOSE SERPL-MCNC: 181 MG/DL (ref 74–106)
HCO3 BLD-SCNC: 28.1 MEQ/L (ref 21–32)
HCT VFR BLD CALC: 29.2 % (ref 39–51)
HGB BLD-MCNC: 9.6 GM/DL (ref 13–17)
LYMPHOCYTES # BLD AUTO: 1.5 TH/MM3 (ref 1–4.8)
LYMPHOCYTES NFR BLD AUTO: 37.2 % (ref 9–44)
MAGNESIUM SERPL-MCNC: 1.9 MG/DL (ref 1.5–2.5)
MCH RBC QN AUTO: 30.9 PG (ref 27–34)
MCHC RBC AUTO-ENTMCNC: 33 % (ref 32–36)
MCV RBC AUTO: 93.6 FL (ref 80–100)
MONOCYTE #: 0.2 TH/MM3 (ref 0–0.9)
MONOCYTES NFR BLD: 5.6 % (ref 0–8)
NEUTROPHILS # BLD AUTO: 2.1 TH/MM3 (ref 1.8–7.7)
NEUTROPHILS NFR BLD AUTO: 52.6 % (ref 16–70)
PHOSPHATE SERPL-MCNC: 2.1 MG/DL (ref 2.5–4.9)
PLATELET # BLD: 153 TH/MM3 (ref 150–450)
PMV BLD AUTO: 8.1 FL (ref 7–11)
PROT SERPL-MCNC: 6 GM/DL (ref 6.4–8.2)
RBC # BLD AUTO: 3.12 MIL/MM3 (ref 4.5–5.9)
SODIUM SERPL-SCNC: 142 MEQ/L (ref 136–145)
WBC # BLD AUTO: 3.9 TH/MM3 (ref 4–11)

## 2017-12-25 RX ADMIN — HEPARIN SODIUM SCH UNITS: 10000 INJECTION, SOLUTION INTRAVENOUS; SUBCUTANEOUS at 21:05

## 2017-12-25 RX ADMIN — IPRATROPIUM BROMIDE AND ALBUTEROL SULFATE SCH AMPULE: .5; 3 SOLUTION RESPIRATORY (INHALATION) at 00:00

## 2017-12-25 RX ADMIN — STANDARDIZED SENNA CONCENTRATE AND DOCUSATE SODIUM SCH TAB: 8.6; 5 TABLET, FILM COATED ORAL at 09:31

## 2017-12-25 RX ADMIN — STANDARDIZED SENNA CONCENTRATE AND DOCUSATE SODIUM SCH TAB: 8.6; 5 TABLET, FILM COATED ORAL at 21:00

## 2017-12-25 RX ADMIN — HEPARIN SODIUM SCH UNITS: 10000 INJECTION, SOLUTION INTRAVENOUS; SUBCUTANEOUS at 09:32

## 2017-12-25 RX ADMIN — IPRATROPIUM BROMIDE AND ALBUTEROL SULFATE SCH AMPULE: .5; 3 SOLUTION RESPIRATORY (INHALATION) at 08:00

## 2017-12-25 RX ADMIN — CLOPIDOGREL BISULFATE SCH MG: 75 TABLET, FILM COATED ORAL at 09:31

## 2017-12-25 RX ADMIN — HUMAN INSULIN SCH: 100 INJECTION, SOLUTION SUBCUTANEOUS at 08:00

## 2017-12-25 RX ADMIN — IPRATROPIUM BROMIDE AND ALBUTEROL SULFATE SCH AMPULE: .5; 3 SOLUTION RESPIRATORY (INHALATION) at 11:40

## 2017-12-25 RX ADMIN — IPRATROPIUM BROMIDE AND ALBUTEROL SULFATE SCH AMPULE: .5; 3 SOLUTION RESPIRATORY (INHALATION) at 03:49

## 2017-12-25 RX ADMIN — HUMAN INSULIN SCH: 100 INJECTION, SOLUTION SUBCUTANEOUS at 21:06

## 2017-12-25 RX ADMIN — CHLORHEXIDINE GLUCONATE SCH PACK: 500 CLOTH TOPICAL at 04:00

## 2017-12-25 RX ADMIN — FAMOTIDINE SCH MG: 20 TABLET, FILM COATED ORAL at 21:04

## 2017-12-25 RX ADMIN — FAMOTIDINE SCH MG: 20 TABLET, FILM COATED ORAL at 09:31

## 2017-12-25 RX ADMIN — FOLIC ACID SCH MG: 1 TABLET ORAL at 09:31

## 2017-12-25 RX ADMIN — HUMAN INSULIN SCH: 100 INJECTION, SOLUTION SUBCUTANEOUS at 00:00

## 2017-12-25 RX ADMIN — HUMAN INSULIN SCH: 100 INJECTION, SOLUTION SUBCUTANEOUS at 17:59

## 2017-12-25 RX ADMIN — IPRATROPIUM BROMIDE AND ALBUTEROL SULFATE SCH AMPULE: .5; 3 SOLUTION RESPIRATORY (INHALATION) at 23:56

## 2017-12-25 RX ADMIN — ASPIRIN SCH MG: 81 TABLET ORAL at 09:31

## 2017-12-25 RX ADMIN — IPRATROPIUM BROMIDE AND ALBUTEROL SULFATE SCH AMPULE: .5; 3 SOLUTION RESPIRATORY (INHALATION) at 16:08

## 2017-12-25 RX ADMIN — HUMAN INSULIN SCH: 100 INJECTION, SOLUTION SUBCUTANEOUS at 12:00

## 2017-12-25 RX ADMIN — HUMAN INSULIN SCH: 100 INJECTION, SOLUTION SUBCUTANEOUS at 05:48

## 2017-12-25 RX ADMIN — IPRATROPIUM BROMIDE AND ALBUTEROL SULFATE SCH AMPULE: .5; 3 SOLUTION RESPIRATORY (INHALATION) at 21:10

## 2017-12-25 NOTE — EKG
Date Performed: 12/23/2017       Time Performed: 16:16:02

 

PTAGE:      89 years

 

EKG:      Sinus rhythm 

 

 WITH FIRST DEGREE AV BLOCK VOLTAGE CRITERIA FOR LVH POSSIBLE ANTERIOR MYOCARDIAL INFARCTION , OF IND
ETERMINATE AGE INFERIOR MYOCARDIAL INFARCTION , PROBABLY OLD MODERATE T-WAVE ABNORMALITY ABNORMAL ECG


 

PREVIOUS TRACING       : 07/06/2000 07.48 Compared to the previous tracing abnormal R wave progressio
n present

 

DOCTOR:   Maru Bucio  Interpretating Date/Time  12/25/2017 09:20:24

## 2017-12-25 NOTE — HHI.CCPN
Subjective


Remarks/Hospital Course


Patient is an 89-year-old male with past medical history of coronary 

arterydisease, previous CABG, hypertension, diabetes mellitus, hyperlipidemia, 

prostate cancer, hypothyroidism and rheumatoid arthritis.  The patient was 

initially hospitalized at [facility] for generalized weakness,


frequent falls and difficulty ambulating.  During his hospital stay, he was 

seen by a neurologist and had a CT scan of the brain which showed no acute 

intracranial findings. The patient was transferred to [facility] for therapy 

yesterday.  According to his medical records, the patient was noted to have 

bradycardia at the outside facility.  He also was evaluated by cardiology at 

[place] for syncope, which was thought secondary to beta-blocker and it was 

discontinued. A HaliCAT was called as the patient looked ashen and had 

questionable syncopal episode.  He was placed back in bed where he improved 

spontaneously.  The patient was transferred to the intensive care unit for 

close observation. When seen, he is on room air oxygen with a pulse of 69, 

blood pressure 130/59, saturation 95% on room air. A Code Blue was called in 

[facility]; however, the patient did not receive any medications or CPR.





12/24 No events overnight. Patient is lying in bed in NAD. Afebrile.





12/25:





Objective





Vital Signs








  Date Time  Temp Pulse Resp B/P (MAP) Pulse Ox O2 Delivery O2 Flow Rate FiO2


 


12/25/17 11:41     93   


 


12/25/17 11:00  79      


 


12/25/17 09:04      Nasal Cannula 2.00 


 


12/25/17 08:00 98.5  16 162/75 (104)    














Intake and Output   


 


 12/25/17 12/25/17 12/26/17





 08:00 16:00 00:00


 


Intake Total 1875 ml  


 


Output Total 850 ml  


 


Balance 1025 ml  








Result Diagram:  


12/25/17 0427                                                                  

              12/25/17 0427





Imaging





Last Impressions








Head CT 12/23/17 0000 Signed





Impressions: 





 Service Date/Time:  Saturday, December 23, 2017 11:31 - CONCLUSION:  Moderate 





 motion artifact, atrophy, negative for acute process..     Thien Talavera MD

  





 FACR


 


Chest X-Ray 12/23/17 0000 Signed





Impressions: 





 Service Date/Time:  Saturday, December 23, 2017 00:00 - CONCLUSION:  Bibasilar 





 parenchymal changes with elevation right hemidiaphragm otherwise negative.     





 Thien Talavera MD  FACR


 


Carotid Artery Ultrasound 12/23/17 0000 Signed





Impressions: 





 Service Date/Time:  Saturday, December 23, 2017 14:23 - CONCLUSION:   Negative 





 examination for a hemodynamically significant carotid stenosis.        Board 





 Certified Radiologist.  This report was verified electronically.   MD 








Objective Remarks


GENERAL: Patient is 88 yo lying in bed in NAD


SKIN: Warm and dry.


HEAD: Normocephalic.


EYES: No scleral icterus. No injection or drainage. 


NECK: Supple, trachea midline. No JVD or lymphadenopathy.


CARDIOVASCULAR: Regular rate and rhythm without murmurs, gallops, or rubs. 


RESPIRATORY: Breath sounds equal bilaterally. No accessory muscle use.


GASTROINTESTINAL: Abdomen soft, non-tender, nondistended. 


MUSCULOSKELETAL: No cyanosis, or edema. 


Neuro: Awake








A/P


Assessment and Plan


1. Status post syncopal episode.


2. Generalized weakness and frequent falls.


3. History of coronary artery disease and CABG.


4. Hypertension.


5. Diabetes mellitus.


6. Hyperlipidemia.


7. Hypothyroidism.


8. History of prostate cancer.


9. Rheumatoid arthritis.


 


Plan





Neuro:  Monitor neuro status closely and avoid any sedatives.


            CT brain yesterday negative for acute process.


            Doppler US carotid arteries: Negative for hemodynamically 

significant carotid stenosis.





CV: Monitor HR and BP and maintain MAP> 65 mmHg.


      Continue with ASA 81 mg daily and Plavix 75  mg daily.


      Avoid beta blocker or calcium channel blocker as the patient has a 

history of bradycardia, which was thought to be related to a beta blocker at 

[facility]..


      Check 2D echo to evaluate left ventricular function 





Pulm: Continue oxygen PRN to maintain sats > 92%.


         Bronchodilators 





: Monitor  renal function and electrolyte replacement as needed. Will need 

Phos replacement today





GI: On Pepcid for  GI prophylaxis.  Heart healthy diet 





ID: Monitor for signs of infection( fever and WBCs). Pan culture if spikes 

fever.  








Endo: SSI with Accu-Chek for glycemic control.


         On Synthroid at 25 mcg daily,  TSH: 3.5





GI prophylaxis with Pepcid and DVT prophylaxis with SCDs/Heparin SQ





Level 2











Nikko Sinha MD Dec 25, 2017 13:04

## 2017-12-26 VITALS
RESPIRATION RATE: 20 BRPM | HEART RATE: 88 BPM | DIASTOLIC BLOOD PRESSURE: 85 MMHG | OXYGEN SATURATION: 94 % | TEMPERATURE: 98.5 F | SYSTOLIC BLOOD PRESSURE: 172 MMHG

## 2017-12-26 VITALS
SYSTOLIC BLOOD PRESSURE: 175 MMHG | OXYGEN SATURATION: 96 % | DIASTOLIC BLOOD PRESSURE: 84 MMHG | HEART RATE: 79 BPM | RESPIRATION RATE: 18 BRPM | TEMPERATURE: 98.2 F

## 2017-12-26 VITALS
RESPIRATION RATE: 18 BRPM | DIASTOLIC BLOOD PRESSURE: 75 MMHG | SYSTOLIC BLOOD PRESSURE: 172 MMHG | HEART RATE: 74 BPM | TEMPERATURE: 98.8 F | OXYGEN SATURATION: 95 %

## 2017-12-26 VITALS
HEART RATE: 97 BPM | TEMPERATURE: 98.1 F | SYSTOLIC BLOOD PRESSURE: 190 MMHG | DIASTOLIC BLOOD PRESSURE: 88 MMHG | RESPIRATION RATE: 18 BRPM | OXYGEN SATURATION: 97 %

## 2017-12-26 VITALS
RESPIRATION RATE: 18 BRPM | DIASTOLIC BLOOD PRESSURE: 79 MMHG | OXYGEN SATURATION: 95 % | TEMPERATURE: 98.9 F | SYSTOLIC BLOOD PRESSURE: 166 MMHG | HEART RATE: 75 BPM

## 2017-12-26 VITALS
RESPIRATION RATE: 18 BRPM | DIASTOLIC BLOOD PRESSURE: 60 MMHG | HEART RATE: 84 BPM | OXYGEN SATURATION: 95 % | TEMPERATURE: 98 F | SYSTOLIC BLOOD PRESSURE: 137 MMHG

## 2017-12-26 VITALS — OXYGEN SATURATION: 96 %

## 2017-12-26 VITALS — OXYGEN SATURATION: 94 %

## 2017-12-26 RX ADMIN — HUMAN INSULIN SCH: 100 INJECTION, SOLUTION SUBCUTANEOUS at 20:39

## 2017-12-26 RX ADMIN — IPRATROPIUM BROMIDE AND ALBUTEROL SULFATE SCH AMPULE: .5; 3 SOLUTION RESPIRATORY (INHALATION) at 13:12

## 2017-12-26 RX ADMIN — IPRATROPIUM BROMIDE AND ALBUTEROL SULFATE SCH AMPULE: .5; 3 SOLUTION RESPIRATORY (INHALATION) at 03:43

## 2017-12-26 RX ADMIN — HUMAN INSULIN SCH: 100 INJECTION, SOLUTION SUBCUTANEOUS at 00:11

## 2017-12-26 RX ADMIN — IPRATROPIUM BROMIDE AND ALBUTEROL SULFATE SCH AMPULE: .5; 3 SOLUTION RESPIRATORY (INHALATION) at 03:36

## 2017-12-26 RX ADMIN — IPRATROPIUM BROMIDE AND ALBUTEROL SULFATE SCH AMPULE: .5; 3 SOLUTION RESPIRATORY (INHALATION) at 07:54

## 2017-12-26 RX ADMIN — IPRATROPIUM BROMIDE AND ALBUTEROL SULFATE SCH AMPULE: .5; 3 SOLUTION RESPIRATORY (INHALATION) at 16:00

## 2017-12-26 RX ADMIN — LEVOTHYROXINE SODIUM SCH MCG: 25 TABLET ORAL at 05:56

## 2017-12-26 RX ADMIN — HEPARIN SODIUM SCH UNITS: 10000 INJECTION, SOLUTION INTRAVENOUS; SUBCUTANEOUS at 08:19

## 2017-12-26 RX ADMIN — HUMAN INSULIN SCH: 100 INJECTION, SOLUTION SUBCUTANEOUS at 08:00

## 2017-12-26 RX ADMIN — HUMAN INSULIN SCH: 100 INJECTION, SOLUTION SUBCUTANEOUS at 04:47

## 2017-12-26 RX ADMIN — HEPARIN SODIUM SCH UNITS: 10000 INJECTION, SOLUTION INTRAVENOUS; SUBCUTANEOUS at 20:38

## 2017-12-26 RX ADMIN — FOLIC ACID SCH MG: 1 TABLET ORAL at 08:19

## 2017-12-26 RX ADMIN — IPRATROPIUM BROMIDE AND ALBUTEROL SULFATE SCH AMPULE: .5; 3 SOLUTION RESPIRATORY (INHALATION) at 23:37

## 2017-12-26 RX ADMIN — HUMAN INSULIN SCH: 100 INJECTION, SOLUTION SUBCUTANEOUS at 16:00

## 2017-12-26 RX ADMIN — FAMOTIDINE SCH MG: 20 TABLET, FILM COATED ORAL at 08:19

## 2017-12-26 RX ADMIN — CHLORHEXIDINE GLUCONATE SCH PACK: 500 CLOTH TOPICAL at 04:00

## 2017-12-26 RX ADMIN — STANDARDIZED SENNA CONCENTRATE AND DOCUSATE SODIUM SCH TAB: 8.6; 5 TABLET, FILM COATED ORAL at 08:19

## 2017-12-26 RX ADMIN — IPRATROPIUM BROMIDE AND ALBUTEROL SULFATE SCH AMPULE: .5; 3 SOLUTION RESPIRATORY (INHALATION) at 20:33

## 2017-12-26 RX ADMIN — FAMOTIDINE SCH MG: 20 TABLET, FILM COATED ORAL at 20:38

## 2017-12-26 RX ADMIN — CLOPIDOGREL BISULFATE SCH MG: 75 TABLET, FILM COATED ORAL at 08:19

## 2017-12-26 RX ADMIN — STANDARDIZED SENNA CONCENTRATE AND DOCUSATE SODIUM SCH TAB: 8.6; 5 TABLET, FILM COATED ORAL at 20:38

## 2017-12-26 RX ADMIN — HUMAN INSULIN SCH: 100 INJECTION, SOLUTION SUBCUTANEOUS at 12:00

## 2017-12-26 RX ADMIN — ASPIRIN SCH MG: 81 TABLET ORAL at 08:18

## 2017-12-26 RX ADMIN — LEVOTHYROXINE SODIUM SCH MCG: 25 TABLET ORAL at 06:33

## 2017-12-26 NOTE — HHI.PR
Subjective


Remarks


Follow-up syncopal episode. Patient denies chest pain, dyspnea, lightheadedness

, dizziness. Does report cough that started this morning. Cough is 

nonproductive.





Objective


Vitals





Vital Signs








  Date Time  Temp Pulse Resp B/P (MAP) Pulse Ox O2 Delivery O2 Flow Rate FiO2


 


12/26/17 11:56 98.8 74 18 172/75 (107) 95   


 


12/26/17 08:01 98.0 84 18 137/60 (85) 95   


 


12/26/17 07:56     96   


 


12/26/17 04:00 98.9 75 18 166/79 (108) 95   


 


12/26/17 00:00 98.5 88 20 172/85 (114) 94   


 


12/25/17 21:11     93   21


 


12/25/17 20:00 98.2 85 20 153/71 (98) 93   


 


12/25/17 17:18 97.5 78 19 164/79 (107) 97   


 


12/25/17 16:08     99   21


 


12/25/17 14:47 97.9 76 18 142/68 (92) 97   


 


12/25/17 14:00  77      














I/O      


 


 12/25/17 12/25/17 12/25/17 12/26/17 12/26/17 12/26/17





 07:00 15:00 23:00 07:00 15:00 23:00


 


Intake Total 1650 ml 225 ml 480 ml   


 


Output Total 850 ml 450 ml  650 ml  


 


Balance 800 ml -225 ml 480 ml -650 ml  


 


      


 


Intake Oral 100 ml  480 ml   


 


IV Total 1550 ml 225 ml    


 


Output Urine Total 850 ml 450 ml  650 ml  


 


# Voids   1   


 


# Bowel Movements 1  1 0  








Result Diagram:  


12/25/17 0427 12/25/17 0427





Imaging





Last Impressions








Head CT 12/23/17 0000 Signed





Impressions: 





 Service Date/Time:  Saturday, December 23, 2017 11:31 - CONCLUSION:  Moderate 





 motion artifact, atrophy, negative for acute process..     Thien Talavera MD

  





 FACR


 


Chest X-Ray 12/23/17 0000 Signed





Impressions: 





 Service Date/Time:  Saturday, December 23, 2017 00:00 - CONCLUSION:  Bibasilar 





 parenchymal changes with elevation right hemidiaphragm otherwise negative.     





 Thien Talavera MD  FACR


 


Carotid Artery Ultrasound 12/23/17 0000 Signed





Impressions: 





 Service Date/Time:  Saturday, December 23, 2017 14:23 - CONCLUSION:   Negative 





 examination for a hemodynamically significant carotid stenosis.        Board 





 Certified Radiologist.  This report was verified electronically.   MD 








Objective Remarks


General: Elderly male in no acute distress. Hard of hearing.


Heart: Regular rate and rhythm. No murmur.


Lungs: Clear to auscultation bilaterally. No wheezes, rales, or rhonchi. 

Breathing is nonlabored.


Abdomen: Soft, nontender, nondistended.


Extremities: No lower extremity edema.


Psych: Alert and oriented.


Procedures


None


Urinary Catheter:  No


Vascular Central Line Catheter:  No





A/P


Assessment and Plan


1. Syncopal episode: Patient had episodes of bradycardia, felt to be related to 

beta blocker. The beta blocker has been discontinued. 2-D echocardiogram noted. 

Carotid artery ultrasound negative. CT of the head is negative. Monitor on 

telemetry.


2. Generalized weakness, frequent falls: Continue PT.


3. Coronary artery disease: Continue aspirin, Plavix.


4. Diabetes mellitus: Monitor Accu-Cheks and cover with sliding scale insulin.


5. Hypothyroidism: Continue Synthroid.


6. GI prophylaxis: Pepcid.


7. DVT prophylaxis: SCDs, heparin.


8. Hypertension: Avoid beta blocker, calcium channel blocker secondary to 

bradycardia, syncope.


9. Cough: Continue oxygen as needed. Bronchodilators as needed.


Discharge Planning


Possible discharge back to inpatient rehabilitation tomorrow.











Juan Kelsey MD Dec 26, 2017 13:43

## 2017-12-27 VITALS
DIASTOLIC BLOOD PRESSURE: 75 MMHG | TEMPERATURE: 98 F | RESPIRATION RATE: 18 BRPM | HEART RATE: 75 BPM | OXYGEN SATURATION: 96 % | SYSTOLIC BLOOD PRESSURE: 156 MMHG

## 2017-12-27 VITALS
TEMPERATURE: 98.3 F | SYSTOLIC BLOOD PRESSURE: 155 MMHG | DIASTOLIC BLOOD PRESSURE: 77 MMHG | HEART RATE: 63 BPM | OXYGEN SATURATION: 96 % | RESPIRATION RATE: 18 BRPM

## 2017-12-27 VITALS
RESPIRATION RATE: 18 BRPM | HEART RATE: 67 BPM | SYSTOLIC BLOOD PRESSURE: 154 MMHG | TEMPERATURE: 98 F | OXYGEN SATURATION: 94 % | DIASTOLIC BLOOD PRESSURE: 72 MMHG

## 2017-12-27 VITALS
OXYGEN SATURATION: 95 % | TEMPERATURE: 97.9 F | HEART RATE: 84 BPM | DIASTOLIC BLOOD PRESSURE: 68 MMHG | RESPIRATION RATE: 18 BRPM | SYSTOLIC BLOOD PRESSURE: 125 MMHG

## 2017-12-27 VITALS — OXYGEN SATURATION: 94 %

## 2017-12-27 LAB
BASOPHILS # BLD AUTO: 0 TH/MM3 (ref 0–0.2)
BASOPHILS NFR BLD: 0.5 % (ref 0–2)
BUN SERPL-MCNC: 17 MG/DL (ref 7–18)
CALCIUM SERPL-MCNC: 8.1 MG/DL (ref 8.5–10.1)
CHLORIDE SERPL-SCNC: 104 MEQ/L (ref 98–107)
CREAT SERPL-MCNC: 0.95 MG/DL (ref 0.6–1.3)
EOSINOPHIL # BLD: 0.4 TH/MM3 (ref 0–0.4)
EOSINOPHIL NFR BLD: 10.6 % (ref 0–4)
ERYTHROCYTE [DISTWIDTH] IN BLOOD BY AUTOMATED COUNT: 17.6 % (ref 11.6–17.2)
GFR SERPLBLD BASED ON 1.73 SQ M-ARVRAT: 75 ML/MIN (ref 89–?)
GLUCOSE SERPL-MCNC: 259 MG/DL (ref 74–106)
HCO3 BLD-SCNC: 27.1 MEQ/L (ref 21–32)
HCT VFR BLD CALC: 30.9 % (ref 39–51)
HGB BLD-MCNC: 10 GM/DL (ref 13–17)
LYMPHOCYTES # BLD AUTO: 0.9 TH/MM3 (ref 1–4.8)
LYMPHOCYTES NFR BLD AUTO: 24.2 % (ref 9–44)
MCH RBC QN AUTO: 30.8 PG (ref 27–34)
MCHC RBC AUTO-ENTMCNC: 32.5 % (ref 32–36)
MCV RBC AUTO: 94.6 FL (ref 80–100)
MONOCYTE #: 0.5 TH/MM3 (ref 0–0.9)
MONOCYTES NFR BLD: 12.6 % (ref 0–8)
NEUTROPHILS # BLD AUTO: 2 TH/MM3 (ref 1.8–7.7)
NEUTROPHILS NFR BLD AUTO: 52.1 % (ref 16–70)
PLATELET # BLD: 134 TH/MM3 (ref 150–450)
PMV BLD AUTO: 8 FL (ref 7–11)
RBC # BLD AUTO: 3.27 MIL/MM3 (ref 4.5–5.9)
SODIUM SERPL-SCNC: 138 MEQ/L (ref 136–145)
WBC # BLD AUTO: 3.8 TH/MM3 (ref 4–11)

## 2017-12-27 RX ADMIN — HUMAN INSULIN SCH: 100 INJECTION, SOLUTION SUBCUTANEOUS at 00:15

## 2017-12-27 RX ADMIN — FAMOTIDINE SCH MG: 20 TABLET, FILM COATED ORAL at 09:23

## 2017-12-27 RX ADMIN — CLOPIDOGREL BISULFATE SCH MG: 75 TABLET, FILM COATED ORAL at 09:24

## 2017-12-27 RX ADMIN — HEPARIN SODIUM SCH UNITS: 10000 INJECTION, SOLUTION INTRAVENOUS; SUBCUTANEOUS at 09:25

## 2017-12-27 RX ADMIN — FOLIC ACID SCH MG: 1 TABLET ORAL at 09:25

## 2017-12-27 RX ADMIN — HUMAN INSULIN SCH: 100 INJECTION, SOLUTION SUBCUTANEOUS at 12:25

## 2017-12-27 RX ADMIN — HUMAN INSULIN SCH: 100 INJECTION, SOLUTION SUBCUTANEOUS at 04:19

## 2017-12-27 RX ADMIN — ASPIRIN SCH MG: 81 TABLET ORAL at 09:24

## 2017-12-27 RX ADMIN — STANDARDIZED SENNA CONCENTRATE AND DOCUSATE SODIUM SCH TAB: 8.6; 5 TABLET, FILM COATED ORAL at 09:24

## 2017-12-27 RX ADMIN — IPRATROPIUM BROMIDE AND ALBUTEROL SULFATE SCH AMPULE: .5; 3 SOLUTION RESPIRATORY (INHALATION) at 03:30

## 2017-12-27 RX ADMIN — CHLORHEXIDINE GLUCONATE SCH PACK: 500 CLOTH TOPICAL at 04:00

## 2017-12-27 RX ADMIN — IPRATROPIUM BROMIDE AND ALBUTEROL SULFATE SCH AMPULE: .5; 3 SOLUTION RESPIRATORY (INHALATION) at 11:31

## 2017-12-27 RX ADMIN — LEVOTHYROXINE SODIUM SCH MCG: 25 TABLET ORAL at 05:57

## 2017-12-27 RX ADMIN — IPRATROPIUM BROMIDE AND ALBUTEROL SULFATE SCH AMPULE: .5; 3 SOLUTION RESPIRATORY (INHALATION) at 08:00

## 2017-12-27 RX ADMIN — HUMAN INSULIN SCH: 100 INJECTION, SOLUTION SUBCUTANEOUS at 09:22

## 2017-12-27 NOTE — HHI.DCPOC
Discharge Care Plan


Diagnosis:  


(1) Syncope


(2) Frequent falls


(3) Bradycardia


(4) Hyperlipidemia


(5) Hypothyroidism


(6) Hypertension


Goals to Promote Your Health


* To prevent worsening of your condition and complications


* To maintain your health at the optimal level


Directions to Meet Your Goals


*** Take your medications as prescribed


*** Follow your dietary instruction


*** Follow activity as directed








*** Keep your appointments as scheduled


*** Take your immunizations and boosters as scheduled


*** If your symptoms worsen call your PCP, if no PCP go to Urgent Care Center 

or Emergency Room***


*** Smoking is Dangerous to Your Health. Avoid second hand smoke***


***Call the 24-hour hour crisis hotline for domestic abuse at 1-261.543.1651***











Juan eKlsey MD Dec 27, 2017 08:58

## 2017-12-27 NOTE — HHI.DS
__________________________________________________





Discharge Summary


Admission Date


Dec 23, 2017 at 10:32


Discharge Date:  Dec 27, 2017


Admitting Diagnosis


Syncope





(1) Syncope


ICD Code:  R55 - Syncope and collapse


(2) Hypertension


ICD Code:  I10 - Essential (primary) hypertension


Status:  Chronic


(3) Hypothyroidism


ICD Code:  E03.9 - Hypothyroidism, unspecified


Status:  Chronic


(4) Hyperlipidemia


ICD Code:  E78.5 - Hyperlipidemia, unspecified


Status:  Chronic


(5) Bradycardia


ICD Code:  R00.1 - Bradycardia, unspecified


Status:  Acute


Procedures


None


Brief History - From Admission


The patient is an 89-year-old male with past medical history of coronary artery


disease, previous CABG, hypertension, diabetes mellitus, hyperlipidemia,


prostate cancer, hypothyroidism and rheumatoid arthritis.  The patient was


initially hospitalized at [facility] for generalized weakness,


frequent falls and difficulty ambulating.  During his hospital stay, he was


seen by a neurologist and had a CT scan of the brain which showed no acute


intracranial findings. The patient was transferred to [facility] for therapy


yesterday.  According to his medical records, the patient was noted to have


bradycardia at the outside facility.  He also was evaluated by cardiology at


[place] for syncope, which was thought secondary to beta-blocker and it was


discontinued. A HaliCAT was called as the patient looked ashen and had


questionable syncopal episode.  He was placed back in bed where he improved


spontaneously.  The patient was transferred to the intensive care unit for


close observation.


 


When seen, he is on room air oxygen with a pulse of 69, blood pressure 130/59,


saturation 95% on room air. A Code Blue was called in [facility]; however,


the patient did not receive any medications or CPR.


CBC/BMP:  


12/27/17 0950                                                                  

              12/27/17 0950





Significant Findings





Laboratory Tests








Test


  12/25/17


04:27 12/27/17


09:50


 


White Blood Count


  3.9 TH/MM3


(4.0-11.0) 3.8 TH/MM3


(4.0-11.0)


 


Red Blood Count


  3.12 MIL/MM3


(4.50-5.90) 3.27 MIL/MM3


(4.50-5.90)


 


Hemoglobin


  9.6 GM/DL


(13.0-17.0) 10.0 GM/DL


(13.0-17.0)


 


Hematocrit


  29.2 %


(39.0-51.0) 30.9 %


(39.0-51.0)


 


Eosinophils (%) (Auto)


  4.4 %


(0.0-4.0) 10.6 %


(0.0-4.0)


 


Blood Urea Nitrogen


  19 MG/DL


(7-18) 


 


 


Random Glucose


  181 MG/DL


() 259 MG/DL


()


 


Total Protein


  6.0 GM/DL


(6.4-8.2) 


 


 


Calcium Level


  7.4 MG/DL


(8.5-10.1) 8.1 MG/DL


(8.5-10.1)


 


Phosphorus Level


  2.1 MG/DL


(2.5-4.9) 


 


 


Protein Corrected Calcium


  8.0 MG/DL


(8.5-10.1) 


 


 


Red Cell Distribution Width


  


  17.6 %


(11.6-17.2)


 


Platelet Count


  


  134 TH/MM3


(150-450)


 


Monocytes (%) (Auto)


  


  12.6 %


(0.0-8.0)


 


Lymphocytes # (Auto)


  


  0.9 TH/MM3


(1.0-4.8)


 


Estimat Glomerular Filtration


Rate 


  75 ML/MIN


(>89)








Imaging





Last Impressions








Head CT 12/23/17 0000 Signed





Impressions: 





 Service Date/Time:  Saturday, December 23, 2017 11:31 - CONCLUSION:  Moderate 





 motion artifact, atrophy, negative for acute process..     Thien Talavera MD

  





 FACR


 


Chest X-Ray 12/23/17 0000 Signed





Impressions: 





 Service Date/Time:  Saturday, December 23, 2017 00:00 - CONCLUSION:  Bibasilar 





 parenchymal changes with elevation right hemidiaphragm otherwise negative.     





 Thien Talavera MD  FACR


 


Carotid Artery Ultrasound 12/23/17 0000 Signed





Impressions: 





 Service Date/Time:  Saturday, December 23, 2017 14:23 - CONCLUSION:   Negative 





 examination for a hemodynamically significant carotid stenosis.        Board 





 Certified Radiologist.  This report was verified electronically.   MD ROWLAND at Discharge


General: Elderly male in no acute distress. Hard of hearing.


Heart: Regular rate and rhythm. No murmur.


Lungs: Clear to auscultation bilaterally. No wheezes, rales, or rhonchi. 

Breathing is nonlabored.


Abdomen: Soft, nontender, nondistended.


Extremities: No lower extremity edema.


Psych: Alert and oriented.


Hospital Course


Patient was admitted to the ICU under the critical care service for further 

evaluation of syncope, bradycardia. The patient's symptoms improved. He was 

transferred to the medical/surgical floor under the hospitalist service. 

Telemetry showed occasional PVCs and rate in the 90s. Physical therapy 

evaluated the patient. Echocardiogram showed ejection fraction 50-55%. He was 

felt to be stable for discharge back to inpatient rehabilitation.


Pt Condition on Discharge:  Stable


Discharge Disposition:  Rehab Inpatient


Discharge Time:  > 30 minutes


Discharge Instructions


DIET: Follow Instructions for:  Heart Healthy Diet, Diabetic Diet


Activities you can perform:  Regular-No Restrictions


Follow up Referrals:  


PCP Follow-up - 2 Weeks





Continued Medications:  


Albuterol 8.5 GM Inh (Proair Hfa 8.5 GM Inh) 90 Mcg/Act Aer


2 PUFF INH Q4-6H PRN for SHORTNESS OF BREATH for 1 Day, #1 INHALER 0 Refills


108 mcg/actuation


Aspirin DR (Aspirin 81) 81 Mg Tabdr


81 MG PO DAILY for Stroke Prevention for 1 Day, TAB 0 Refills





Atorvastatin (Lipitor) 10 Mg Tab


10 MG PO HS for 1 Day, TAB





Clopidogrel (Clopidogrel) 75 Mg Tab


75 MG PO DAILY for Blood Clot Prevention for 1 Day, #30 TAB 0 Refills





Docusate Sodium (Colace) 100 Mg Capsule


100 MG PO Q12HR PRN for CONSTIPATION for 1 Day





Folic Acid (Folic Acid) 1 Mg Tablet


1 TAB PO DAILY, #1





Insulin Glargine Inj (Lantus Inj) 1,000 Unit/10 Ml Vial


20 UNITS SQ Q12HR for Blood Sugar Management for 1 Day, VIAL 0 Refills





Levothyroxine (Synthroid) 25 Mcg Tab


25 MCG PO DAILY@0600 for Thyroid for 1 Day, #1 TAB 0 Refills





Methotrexate (Methotrexate) 2.5 Mg Tab


12.5 MG PO WEDNESDAY for Inflammation for 1 Day, TAB 0 Refills





Polyethylene Glycol 3350 Powder (Miralax Powder) 17 Gm Powd


17 GM PO DAILY for Constipation for 1 Day, #1 CAN 0 Refills


Mix and dissolve one measuring cap-ful (17 grams) in water or juice.


Sitagliptin (Januvia) 25 Mg Tab


25 MG PO DAILY for Blood Sugar Management for 1 Day, #30 TAB 0 Refills





 


Discontinued Medications:  


Insulin Aspart Inj (Novolog Flexpen Inj) 300 Unit/3 Ml Pen


8 UNITS SQ TIDAC for Blood Sugar Management for 1 Day, #1 PEN 0 Refills

















Juan Kelsey MD Dec 27, 2017 14:49

## 2017-12-27 NOTE — HHI.PR
Subjective


Remarks


Follow up syncopal episode. Patient denies chest pain, dyspnea. Does report 

nonproductive cough. Is requesting cough drops.





Objective


Vitals





Vital Signs








  Date Time  Temp Pulse Resp B/P (MAP) Pulse Ox O2 Delivery O2 Flow Rate FiO2


 


12/27/17 04:00 98.0 67 18 154/72 (99) 94   


 


12/27/17 00:00 97.9 84 18 125/68 (87) 95   


 


12/26/17 23:39     96   


 


12/26/17 20:33     94   21


 


12/26/17 20:00 98.2 79 18 175/84 (114) 96   


 


12/26/17 16:57 98.1 97 18 190/88 (122) 97   





    171/76 (107)    


 


12/26/17 11:56 98.8 74 18 172/75 (107) 95   














I/O      


 


 12/26/17 12/26/17 12/26/17 12/27/17 12/27/17 12/27/17





 07:00 15:00 23:00 07:00 15:00 23:00


 


Output Total 650 ml 300 ml 325 ml 400 ml  


 


Balance -650 ml -300 ml -325 ml -400 ml  


 


      


 


Output Urine Total 650 ml 300 ml 325 ml 400 ml  


 


# Bowel Movements 0   0  








Result Diagram:  


12/25/17 0427 12/25/17 0427





Imaging





Last Impressions








Head CT 12/23/17 0000 Signed





Impressions: 





 Service Date/Time:  Saturday, December 23, 2017 11:31 - CONCLUSION:  Moderate 





 motion artifact, atrophy, negative for acute process..     Thien Talavera MD

  





 FACR


 


Chest X-Ray 12/23/17 0000 Signed





Impressions: 





 Service Date/Time:  Saturday, December 23, 2017 00:00 - CONCLUSION:  Bibasilar 





 parenchymal changes with elevation right hemidiaphragm otherwise negative.     





 Thien Talavera MD  FACR


 


Carotid Artery Ultrasound 12/23/17 0000 Signed





Impressions: 





 Service Date/Time:  Saturday, December 23, 2017 14:23 - CONCLUSION:   Negative 





 examination for a hemodynamically significant carotid stenosis.        Board 





 Certified Radiologist.  This report was verified electronically.   MD 








Objective Remarks


General: Elderly male in no acute distress. Hard of hearing.


Heart: Regular rate and rhythm. No murmur.


Lungs: Clear to auscultation bilaterally. No wheezes, rales, or rhonchi. 

Breathing is nonlabored.


Abdomen: Soft, nontender, nondistended.


Extremities: No lower extremity edema.


Psych: Alert and oriented.


Procedures


None


Urinary Catheter:  No


Vascular Central Line Catheter:  No





A/P


Assessment and Plan


1. Syncopal episode: Patient had episodes of bradycardia, felt to be related to 

beta blocker. The beta blocker has been discontinued. 2-D echocardiogram noted. 

Carotid artery ultrasound negative. CT of the head is negative. Monitor on 

telemetry. Occasional PVCs noted, but otherwise sinus rhythm.


2. Generalized weakness, frequent falls: Continue PT.


3. Coronary artery disease: Continue aspirin, Plavix.


4. Diabetes mellitus: Monitor Accu-Cheks and cover with sliding scale insulin.


5. Hypothyroidism: Continue Synthroid.


6. GI prophylaxis: Pepcid.


7. DVT prophylaxis: SCDs, heparin.


8. Hypertension: Avoid beta blocker, calcium channel blocker secondary to 

bradycardia, syncope.


9. Cough: Continue oxygen as needed. Bronchodilators as needed.


Discharge Planning


Plan for discharge back to inpatient rehabilitation today pending lab results 

and review of telemetry.











Juan Kelsey MD Dec 27, 2017 08:55

## 2018-02-03 ENCOUNTER — HOSPITAL ENCOUNTER (INPATIENT)
Dept: HOSPITAL 17 - N03A | Age: 83
LOS: 2 days | Discharge: NURSING, REHAB OR LONG-TERM CARE | DRG: 66 | End: 2018-02-05
Attending: HOSPITALIST | Admitting: HOSPITALIST
Payer: MEDICARE

## 2018-02-03 VITALS
SYSTOLIC BLOOD PRESSURE: 159 MMHG | RESPIRATION RATE: 22 BRPM | OXYGEN SATURATION: 98 % | DIASTOLIC BLOOD PRESSURE: 74 MMHG | HEART RATE: 72 BPM | TEMPERATURE: 97.4 F

## 2018-02-03 VITALS — OXYGEN SATURATION: 100 %

## 2018-02-03 VITALS
OXYGEN SATURATION: 100 % | SYSTOLIC BLOOD PRESSURE: 97 MMHG | RESPIRATION RATE: 18 BRPM | DIASTOLIC BLOOD PRESSURE: 52 MMHG | TEMPERATURE: 98 F | HEART RATE: 63 BPM

## 2018-02-03 VITALS
SYSTOLIC BLOOD PRESSURE: 135 MMHG | TEMPERATURE: 97.8 F | RESPIRATION RATE: 13 BRPM | OXYGEN SATURATION: 100 % | HEART RATE: 63 BPM | DIASTOLIC BLOOD PRESSURE: 60 MMHG

## 2018-02-03 VITALS
SYSTOLIC BLOOD PRESSURE: 134 MMHG | HEART RATE: 64 BPM | OXYGEN SATURATION: 100 % | DIASTOLIC BLOOD PRESSURE: 63 MMHG | RESPIRATION RATE: 17 BRPM | TEMPERATURE: 97.6 F

## 2018-02-03 VITALS — HEART RATE: 64 BPM

## 2018-02-03 VITALS — HEART RATE: 71 BPM

## 2018-02-03 VITALS — HEART RATE: 62 BPM

## 2018-02-03 DIAGNOSIS — F03.90: ICD-10-CM

## 2018-02-03 DIAGNOSIS — Z79.02: ICD-10-CM

## 2018-02-03 DIAGNOSIS — Z79.4: ICD-10-CM

## 2018-02-03 DIAGNOSIS — R00.1: ICD-10-CM

## 2018-02-03 DIAGNOSIS — I63.40: Primary | ICD-10-CM

## 2018-02-03 DIAGNOSIS — I25.10: ICD-10-CM

## 2018-02-03 DIAGNOSIS — I25.5: ICD-10-CM

## 2018-02-03 DIAGNOSIS — Z91.81: ICD-10-CM

## 2018-02-03 DIAGNOSIS — I44.1: ICD-10-CM

## 2018-02-03 DIAGNOSIS — Z86.73: ICD-10-CM

## 2018-02-03 DIAGNOSIS — J44.9: ICD-10-CM

## 2018-02-03 DIAGNOSIS — Z95.5: ICD-10-CM

## 2018-02-03 DIAGNOSIS — Z79.82: ICD-10-CM

## 2018-02-03 DIAGNOSIS — R29.6: ICD-10-CM

## 2018-02-03 DIAGNOSIS — I95.9: ICD-10-CM

## 2018-02-03 DIAGNOSIS — E11.9: ICD-10-CM

## 2018-02-03 DIAGNOSIS — Z95.1: ICD-10-CM

## 2018-02-03 DIAGNOSIS — R29.810: ICD-10-CM

## 2018-02-03 DIAGNOSIS — E03.9: ICD-10-CM

## 2018-02-03 DIAGNOSIS — H91.90: ICD-10-CM

## 2018-02-03 DIAGNOSIS — I11.0: ICD-10-CM

## 2018-02-03 DIAGNOSIS — I50.9: ICD-10-CM

## 2018-02-03 DIAGNOSIS — I48.0: ICD-10-CM

## 2018-02-03 DIAGNOSIS — Z87.891: ICD-10-CM

## 2018-02-03 DIAGNOSIS — Z85.46: ICD-10-CM

## 2018-02-03 DIAGNOSIS — R41.82: ICD-10-CM

## 2018-02-03 DIAGNOSIS — Z95.0: ICD-10-CM

## 2018-02-03 LAB
CHOLEST SERPL-MCNC: 125 MG/DL (ref 120–200)
CHOLESTEROL/ HDL RATIO: 2.35 RATIO
HDLC SERPL-MCNC: 53.1 MG/DL (ref 40–60)
LDLC SERPL-MCNC: 54 MG/DL (ref 0–99)
TRIGL SERPL-MCNC: 90 MG/DL (ref 42–150)
VIT B12 SERPL-MCNC: 650 PG/ML (ref 193–986)

## 2018-02-03 PROCEDURE — 70450 CT HEAD/BRAIN W/O DYE: CPT

## 2018-02-03 PROCEDURE — 86901 BLOOD TYPING SEROLOGIC RH(D): CPT

## 2018-02-03 PROCEDURE — 86900 BLOOD TYPING SEROLOGIC ABO: CPT

## 2018-02-03 PROCEDURE — 82948 REAGENT STRIP/BLOOD GLUCOSE: CPT

## 2018-02-03 PROCEDURE — 86850 RBC ANTIBODY SCREEN: CPT

## 2018-02-03 PROCEDURE — 94664 DEMO&/EVAL PT USE INHALER: CPT

## 2018-02-03 PROCEDURE — 80061 LIPID PANEL: CPT

## 2018-02-03 PROCEDURE — 84443 ASSAY THYROID STIM HORMONE: CPT

## 2018-02-03 PROCEDURE — 94640 AIRWAY INHALATION TREATMENT: CPT

## 2018-02-03 PROCEDURE — 85025 COMPLETE CBC W/AUTO DIFF WBC: CPT

## 2018-02-03 PROCEDURE — 82607 VITAMIN B-12: CPT

## 2018-02-03 PROCEDURE — 95819 EEG AWAKE AND ASLEEP: CPT

## 2018-02-03 PROCEDURE — 80053 COMPREHEN METABOLIC PANEL: CPT

## 2018-02-03 RX ADMIN — IPRATROPIUM BROMIDE AND ALBUTEROL SULFATE SCH AMPULE: .5; 3 SOLUTION RESPIRATORY (INHALATION) at 16:23

## 2018-02-03 RX ADMIN — INSULIN ASPART SCH: 100 INJECTION, SOLUTION INTRAVENOUS; SUBCUTANEOUS at 21:00

## 2018-02-03 RX ADMIN — STANDARDIZED SENNA CONCENTRATE AND DOCUSATE SODIUM SCH TAB: 8.6; 5 TABLET, FILM COATED ORAL at 21:15

## 2018-02-03 RX ADMIN — CLOPIDOGREL BISULFATE SCH MG: 75 TABLET, FILM COATED ORAL at 12:39

## 2018-02-03 RX ADMIN — ENOXAPARIN SODIUM SCH MG: 40 INJECTION SUBCUTANEOUS at 12:40

## 2018-02-03 RX ADMIN — FAMOTIDINE SCH MG: 20 TABLET, FILM COATED ORAL at 21:15

## 2018-02-03 RX ADMIN — ASPIRIN SCH MG: 81 TABLET ORAL at 12:40

## 2018-02-03 RX ADMIN — CLINDAMYCIN HYDROCHLORIDE SCH MG: 150 CAPSULE ORAL at 12:53

## 2018-02-03 RX ADMIN — INSULIN ASPART SCH: 100 INJECTION, SOLUTION INTRAVENOUS; SUBCUTANEOUS at 12:00

## 2018-02-03 RX ADMIN — ACYCLOVIR SCH UNITS: 800 TABLET ORAL at 21:15

## 2018-02-03 RX ADMIN — ATORVASTATIN CALCIUM SCH MG: 10 TABLET, FILM COATED ORAL at 21:15

## 2018-02-03 RX ADMIN — Medication SCH ML: at 21:16

## 2018-02-03 RX ADMIN — CLINDAMYCIN HYDROCHLORIDE SCH MG: 150 CAPSULE ORAL at 17:25

## 2018-02-03 RX ADMIN — IPRATROPIUM BROMIDE AND ALBUTEROL SULFATE SCH AMPULE: .5; 3 SOLUTION RESPIRATORY (INHALATION) at 20:53

## 2018-02-03 RX ADMIN — INSULIN ASPART SCH: 100 INJECTION, SOLUTION INTRAVENOUS; SUBCUTANEOUS at 17:00

## 2018-02-03 NOTE — HHI.HP
HPI


Service


Critical Care Medicine


Primary Care Physician


Unknown


Admission Diagnosis





Diagnosis:  


Chief Complaint:  


Syncope, altered mental status


Travel History


International Travel<30 Days:  No


Contact w/Intl Traveler <30 Da:  No


Traveled to Known Affected Are:  No


History of Present Illness


History of Present Illness


This is an 89-year-old male with past medical history significant for coronary 

artery disease, previous CABG, HTN, COPD, syncopal episodes, history of 

frequent falls, symptomatic bradycardia and diabetes who was admitted to 

[facility] on 18 for altered mental status and increased 

confusion.  Workup revealed white blood cell count of 5.7, hemoglobin 12.6 

platelet 198,000 INR was 1.1 sodium 145 potassium 3.9 creatinine was 1.14 down 

from 1.38 glucose was 154.  Troponin was 0.03 and then 0.02.  UA was negative.  

Chest x-ray stable EKG revealed supraventricular arrhythmia with rate of 58 bpm 

with possible first-degree AV block.  Head CT was negative for acute 

abnormality.  Carotid Dopplers in 2017 showed 50% stenosis of the 

internal carotid echo in 2017 showed EF 50-55% with moderate LVH.  

Patient was seen in consultation by Dr. Pereyra of cardiology and diagnosed with 

second degree AV block and underwent a dual lead permanent Medtronic pacemaker 

placement on 18.  Patient was admitted to [facility] for 

inpatient rehabilitation on 18.  Hospitalist services consulted for 

medical management.  Patient seen and examined.  Patient denies any complaints 

at this time except for some mild chest tenderness around the pacemaker 

insertion site.  Denies any fever or chills.  Denies any shortness of breath.  

Denies any nausea, vomiting or abdominal pain.  Prior to his admission, patient 

was living at home with his son.


Patient was doing well on [facility].  He developed altered mental 

status with poor responsiveness slurred speech and right facial droop this 

morning around 8 AM.  A stroke alert was called at the time.  His finger stick 

glucose was 114 at the time.  He had been sitting on the bed in a chair at the 

time of the event.  Patient was taken for head CT which was negative for bleed 

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 

occipital CVA.  Dr. Silva from [facility] contacted me to admit 

patient to the ICU.  Patient was accepted for admission by critical care 

medicine service and I evaluated the patient immediately following his arrival 

to the ICU.  At the time of my evaluation patient was completely awake alert 

and oriented, following commands moving all 4 extremities and did not have any 

facial droop.  He appeared to be back to his baseline.  He reportedly had a 

systolic blood pressure in the 80s documented on rehabilitation to transfer 

however his blood pressure normalized prior to arrival to the ICU.  Patient 

denied any chest pain or shortness of breath. 





Review of Systems


Except as stated in HPI: as per HPI





Past Family Social History


Allergies:  


Coded Allergies:  


     No Known Drug Allergies (Verified  Allergy, Unknown, 17)


Past Medical History


Coronary artery disease


previous CABG


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


HTN


COPD


Symptomatic bradycardia


Diabetes


Prostate CA


Hearing loss


Hypothyroidism


TIA


Past Surgical History


Dual-lead Medtronic permanent pacemaker implantation 18


CABG


Cardiac stent x 5


Kyphoplasty


Abdominal surgery


Reported Medications


Clindamycin 300mg po TID


Methotrexate 10mg po q7days


Albuterol


Plavix 75mg daily


Lipitor 10mg daily


Norvasc 5mg daily


ASA 81mg daily


Oxycodone 5mg po q6h


Docusate 200mg po BID


Miralax 17gm daily


Pepcid 20mg po BID


Levemir 7 units sq hs


Humalog 2-7 units sq achs


Levothyroxine 25mcg po daily


Vitamin B12 1000mcg po daily


Folic acid 1mg po daily





Physical Exam


Physical Exam


HEENT/Neuro: No pallor or icterus, tongue moist, THERESA, Awake alert oriented 3

, nonfocal grossly, moving all 4 extremities


Neck: No JVD


Chest/pulmonary: CTA bilaterally.  Dressing over pacemaker insertion site in 

place


Cardiovascular: S1-S2 regular no gallop or murmur


GI/abdomen: Soft, nontender, bowel sounds present


Extremities: Warm bilaterally, no edema


Laboratory


Review from [facility] this morning.  CBC and BMP, coags 

unremarkable.


Imaging


2/3: Head CT without contrast with left frontoparietal subacute to chronic 

infarct, old right occipital infarct.





2/3: CTA brain: Essentially normal





Caprini VTE Risk Assessment


Caprini VTE Risk Assessment:  Mod/High Risk (score >= 2)


Caprini Risk Assessment Model











 Point Value = 1          Point Value = 2  Point Value = 3  Point Value = 5


 


Age 41-60


Minor surgery


BMI > 25 kg/m2


Swollen legs


Varicose veins


Pregnancy or postpartum


History of unexplained or recurrent


   spontaneous 


Oral contraceptives or hormone


   replacement


Sepsis (< 1 month)


Serious lung disease, including


   pneumonia (< 1 month)


Abnormal pulmonary function


Acute myocardial infarction


Congestive heart failure (< 1 month)


History of inflammatory bowel disease


Medical patient at bed rest Age 61-74


Arthroscopic surgery


Major open surgery (> 45 min)


Laparoscopic surgery (> 45 min)


Malignancy


Confined to bed (> 72 hours)


Immobilizing plaster cast


Central venous access Age >= 75


History of VTE


Family history of VTE


Factor V Leiden


Prothrombin 46259P


Lupus anticoagulant


Anticardiolipin antibodies


Elevated serum homocysteine


Heparin-induced thrombocytopenia


Other congenital or acquired


   thrombophilia Stroke (< 1 month)


Elective arthroplasty


Hip, pelvis, or leg fracture


Acute spinal cord injury (< 1 month)








Prophylaxis Regimen











   Total Risk


Factor Score Risk Level Prophylaxis Regimen


 


0-1      Low Early ambulation


 


2 Moderate Order ONE of the following:


*Sequential Compression Device (SCD)


*Heparin 5000 units SQ BID


 


3-4 Higher Order ONE of the following medications:


*Heparin 5000 units SQ TID


*Enoxaparin/Lovenox 40 mg SQ daily (WT < 150 kg, CrCl > 30 mL/min)


*Enoxaparin/Lovenox 30 mg SQ daily (WT < 150 kg, CrCl > 10-29 mL/min)


*Enoxaparin/Lovenox 30 mg SQ BID (WT < 150 kg, CrCl > 30 mL/min)


AND/OR


*Sequential Compression Device (SCD)


 


5 or more Highest Order ONE of the following medications:


*Heparin 5000 units SQ TID (Preferred with Epidurals)


*Enoxaparin/Lovenox 40 mg SQ daily (WT < 150 kg, CrCl > 30 mL/min)


*Enoxaparin/Lovenox 30 mg SQ daily (WT < 150 kg, CrCl > 10-29 mL/min)


*Enoxaparin/Lovenox 30 mg SQ BID (WT < 150 kg, CrCl > 30 mL/min)


AND


*Sequential Compression Device (SCD)











Assessment and Plan


Assessment and Plan


Syncope/ altered mental status


Questionable right facial weakness which is now resolved. ? TIA versus 

hypoperfusion secondary to low blood pressure.


Hypotension which is now resolved


Coronary artery disease


previous CABG


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


HTN


COPD


Symptomatic bradycardia


Diabetes


Prostate CA


Hearing loss


Hypothyroidism


TIA





Plan:


Neuro: Continue neuro checks.  Patient has been evaluated by neurology.  At 

this time the neuro exam is back to baseline per discussion with Dr. Matute.  

Continue aspirin and Plavix.  In view of findings of subacute infarct on head 

CT.  Patient ideally would need MRI brain which we are trying to clarify if his 

pacer is MRI compatible.  If patient is MRI compatible to proceed with MRI 

brain for further evaluation.  Also since patient is on aspirin and Plavix with 

evidence of subacute infarct may require full anticoagulation though has just 

had a pacer inserted on 2018 at [facility].  Will get 

cardiology evaluation to follow-up for pacer insertion site and to clear for 

full anticoagulation.  Patient may not be a good candidate for full 

anticoagulation view of frequent falls previously.


On clindamycin possibly for pacer insertion site.  Will clarify with cardiology 

this needs to be continued. Consult cardiology for further evaluation of 

syncope as well as evaluate pacer and here for full anticoagulation.


Continue all previous medications including aspirin and Plavix.


Continue Levemir


Need to clarify reason for methotrexate


Await cardiology eval to clear for Lovenox for DVT prophylaxis in view of 

recent pacer insertion.


Patient can be transferred out of ICU per discussion with Dr. Antunez.  We'll 

consult hospitalist service for further medical management and transfer out of 

ICU later today.


Consult palliative care to assist with deciding goals of therapy in this 89-year

-old with multiple chronic medical problems and evidence of recurrent strokes 

to decide long-term goals of therapy.





Discussed with Dr. Silva from [facility], discussed with neurology 

Dr. Matute, discussed with ICU RN.











Nikko Sinha MD Feb 3, 2018 11:24

## 2018-02-03 NOTE — MB
cc:

EVANGELINA GUZMAN MD

****

 

 

DATE OF CONSULTATION:  02/03/2018.

 

REASON FOR CONSULTATION:

Possible syncope / CVA.

 

HISTORY OF PRESENT ILLNESS:

The patient is a pleasant 89-year-old gentleman with a complicated past medical

history including coronary disease with a CABG and multiple stents as well as a

recent Medtronic pacemaker placed last week at [facility].  He was at the

[facility] when there was some question of a facial droop

and/or possible brief change in mental status or loss of consciousness.  The

history is somewhat vague given the patient's poor baseline functionality.  He

was brought to the ICU where apparently he was then asymptomatic.  After

interviewing the patient, I find him to be pleasant but quite somnolent and he

has a very poor time answering questions, some of this is due to his poor

hearing but overall he appears nontoxic.  He does say he has had some chest and

jaw burning for the last three days.  Otherwise the patient is denying any

symptoms.

 

PAST MEDICAL HISTORY:

1. Coronary artery disease status post CABG and multiple stents.

2. Recent Medtronic pacemaker.

3. TIA.

4. Hypothyroidism.

5. Diabetes.

6. Hypertension.

 

(**During this dictation the electronic medical record system has crashed and

is currently unavailable for the remainder of this dictation**).

 

HOME MEDICATIONS:

Home medications from the patient's son's list include:

1. Aspirin.

2. Plavix.

 

CT scan per Dr. Sinha showed subacute infarct.

 

EKG has not been performed but telemetry shows a paced rhythm.

 

 

PHYSICAL EXAMINATION:

VITAL SIGNS: Stable.

GENERAL:  A pleasant elderly gentleman who is very hard of hearing and in no

distress.

NECK: No jugular venous distention.

LUNGS: Clear to auscultation but with poor inspiratory effort.

CARDIOVASCULAR: Regular rate and rhythm. No murmurs appreciated.

ABDOMEN: Benign.

EXTREMITIES: No edema.

 

LABORATORY DATA:

Currently unavailable due to the electronic medical record being. down.

 

IMPRESSION:

1. Possible syncope.  The patient's story seems more consistent with a

transient change in mental status possibly due to low blood pressure status

post recent syncopal episode.  I will have his pacemaker interrogated to ensure

that it is functioning properly.

 

2. CVA.  The patient has what seems to be a subacute infarct on his CT scan.

Due to the recent pacemaker implantation, it is advised that he hold off for

six weeks before and MRI is performed. He is on aspirin and Plavix.  Whether or

not he should be on Coumadin I will defer to the neurology team but given his

very fragile state and the likely significant fall risk, I would be quite

cautious.  Furthermore, his cardiac history is not entirely clear.  Given the

majority of his work was done at [place] and it is possible that long-term

Plavix would be advisable.  I certainly would not recommend triple therapy in a

patient such as this.

 

Further recommendations will be based on his clinical course as well as a

pacemaker interrogation.

 

Thank you again for the opportunity to participate in this patient's care.

 

 

 

                              _________________________________

                              MD ASHLEY Bailon/KERRIE

D:  2/3/2018/1:49 PM

T:  2/3/2018/3:34 PM

Visit #:  [id number]

Job #:  [id number]

## 2018-02-03 NOTE — PD.CONS
History of Present Illness


Service


Neurology


Consult Requested By


rehab md


Reason for Consult


stroke alert


Primary Care Physician


Unknown


History of Present Illness


89-year-old male transferred from [facility] for rehab after having 

pacemaker placed a few days ago. pt had episode of hypotension and then became 

sleepy, rehab team felt he may have some facial weakness. stroke alert was 

called. cta brain/carotids nml. ct brain showed a possible subacute/old left 

frontal infarct. tx'd to icu. 


in the icu he appears back to his baseline. denies ha, focal weakness, dyspnea, 

vision loss. 





hx of syncope/frequent falls.


some hx of underlying memory loss/mild dementia.


pt not the best historian, hx taken from chart. 








Review of Systems


Except as stated in HPI:  all other systems reviewed are Neg





Past Family Social History


Allergies:  


Coded Allergies:  


     No Known Drug Allergies (Verified  Allergy, Unknown, 12/22/17)





Past Medical History


Coronary artery disease


previous CABG


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


HTN


COPD


Symptomatic bradycardia, s/p pacemaker


Diabetes


Prostate CA


Hearing loss


Hypothyroidism


TIA





Past Surgical History


Dual-lead Medtronic permanent pacemaker implantation


CABG


Cardiac stent x 5


Kyphoplasty


Abdominal surgery





Family History


Father, emphysema


Mother, cancer





Social History


quit smoking 40 years ago.  He also reports history of heavy alcohol use but no 

longer drinks.





Review of Systems


All other ROS:  ROS reviewed as documented in chart





Past Family Social History


Allergies:  


Coded Allergies:  


     No Known Drug Allergies (Verified  Allergy, Unknown, 12/22/17)


Active Ordered Medications





Current Medications








 Medications


  (Trade)  Dose


 Ordered  Sig/Roseline


 Route  Start Time


 Stop Time Status Last Admin


 


  (NS Flush)  2 ml  UNSCH  PRN


 IV FLUSH  2/3/18 10:00


     


 


 


  (NS Flush)  2 ml  BID


 IV FLUSH  2/3/18 21:00


     


 


 


  (Tylenol)  650 mg  Q6H  PRN


 PO  2/3/18 10:00


     


 


 


  (Duoneb Neb)  1 ampule  Q4HR NEB  PRN


 INH  2/3/18 10:00


     


 


 


 Miscellaneous


 Information  1  Q361D


 XX  2/3/18 10:00


     


 


 


  (Chlorhexidine


 2% Cloth)  3 pack


 Taper  DAILY@04


 TOP  2/4/18 04:00


 1/31/19 03:59   


 


 


  (Chlorhexidine


 2% Cloth)  3 pack  UNSCH  PRN


 TOP  2/3/18 10:00


     


 


 


  (Dia-Colace)  1 tab  BID


 PO  2/3/18 21:00


     


 


 


  (Milk Of


 Magnesia Liq)  30 ml  Q12H  PRN


 PO  2/3/18 10:00


     


 


 


  (Senokot)  17.2 mg  Q12H  PRN


 PO  2/3/18 10:00


     


 


 


  (Dulcolax Supp)  10 mg  DAILY  PRN


 RECTAL  2/3/18 10:00


     


 


 


  (Lactulose Liq)  30 ml  DAILY  PRN


 PO  2/3/18 10:00


     


 











Exam


General:  Alert and Oriented, No acute distress


Respiratory:  Lungs CTA, Non-labored respirations, BS equal, Symmetrical 

expansion, Other


Cardiology:  Normal rate, No murmur, Intact pulses, Regular Rhythm, Other


Musculoskeletal:  ROM, Swelling, No calf tenderness


Neurologic:  Alert, Oriented, Normal motor, CN II-XII intact, Normal DTR's


Psychiatric:  Cooperative, Appropriate mood & affect


Exam Comments


alerts, ox 2, not to date, repeats, names, follows, mild dysarthric speech, eomi

, face sym, ou 3mm sluggish, tam to gravity, no clonus, planterflexor





Review/Management


Diagnosis/Plan:  


(1) Acute embolic stroke within last 8 weeks


Status:  Acute


Plan:  possible new/subacute infarct vs syncopal episode, ? watch for SIRS/

sepsis


neuro improved with mild dysarthria- ?baseline, dehydration. not tpa candidate 

for minimal nihss, resolved symptoms, recent pacemaker insertion


ct brain showing small infarct- unknown time. was noted to be confused on 

admission and at [facility]- may have had an infarct there





pt with multiple cardiac morbidities





recs


eeg


mri brain if feasible


hydration





watch for sirs/sepsis, recent pacemaker insertion





p.t./s.t.


echo





may need to be placed on coumadin with hep gtt


cardiology anita romano critical care MD and RN








(2) H/O heart artery stent


ICD Codes:  Z95.5 - Presence of coronary angioplasty implant and graft


Status:  Chronic


(3) Congestive heart failure


ICD Codes:  I50.9 - Heart failure, unspecified


Status:  Chronic


(4) Dementia


ICD Codes:  F03.90 - Unspecified dementia without behavioral disturbance


Status:  Chronic


(5) Syncope


ICD Codes:  R55 - Syncope and collapse


Status:  Acute


(6) Frequent falls


ICD Codes:  R29.6 - Repeated falls


Status:  Acute


(7) FH: CABG (coronary artery bypass surgery)


ICD Codes:  Z84.89 - Family history of other specified conditions











Sheldon Matute MD Feb 3, 2018 11:09

## 2018-02-04 VITALS
TEMPERATURE: 98.5 F | RESPIRATION RATE: 19 BRPM | DIASTOLIC BLOOD PRESSURE: 65 MMHG | SYSTOLIC BLOOD PRESSURE: 142 MMHG | OXYGEN SATURATION: 98 % | HEART RATE: 73 BPM

## 2018-02-04 VITALS
DIASTOLIC BLOOD PRESSURE: 63 MMHG | RESPIRATION RATE: 14 BRPM | SYSTOLIC BLOOD PRESSURE: 132 MMHG | HEART RATE: 62 BPM | TEMPERATURE: 98.4 F | OXYGEN SATURATION: 96 %

## 2018-02-04 VITALS
DIASTOLIC BLOOD PRESSURE: 78 MMHG | RESPIRATION RATE: 20 BRPM | SYSTOLIC BLOOD PRESSURE: 153 MMHG | TEMPERATURE: 97.7 F | OXYGEN SATURATION: 97 % | HEART RATE: 69 BPM

## 2018-02-04 VITALS — HEART RATE: 60 BPM

## 2018-02-04 VITALS — OXYGEN SATURATION: 95 %

## 2018-02-04 VITALS
HEART RATE: 67 BPM | OXYGEN SATURATION: 97 % | SYSTOLIC BLOOD PRESSURE: 173 MMHG | TEMPERATURE: 98.4 F | RESPIRATION RATE: 22 BRPM | DIASTOLIC BLOOD PRESSURE: 77 MMHG

## 2018-02-04 VITALS — HEART RATE: 72 BPM

## 2018-02-04 VITALS
OXYGEN SATURATION: 94 % | TEMPERATURE: 97.7 F | HEART RATE: 74 BPM | RESPIRATION RATE: 17 BRPM | DIASTOLIC BLOOD PRESSURE: 71 MMHG | SYSTOLIC BLOOD PRESSURE: 155 MMHG

## 2018-02-04 VITALS — HEART RATE: 63 BPM

## 2018-02-04 LAB
ALBUMIN SERPL-MCNC: 2.5 GM/DL (ref 3.4–5)
ALP SERPL-CCNC: 88 U/L (ref 45–117)
ALT SERPL-CCNC: 16 U/L (ref 12–78)
AST SERPL-CCNC: 21 U/L (ref 15–37)
BASOPHILS # BLD AUTO: 0 TH/MM3 (ref 0–0.2)
BASOPHILS NFR BLD: 0.5 % (ref 0–2)
BILIRUB SERPL-MCNC: 0.5 MG/DL (ref 0.2–1)
BUN SERPL-MCNC: 17 MG/DL (ref 7–18)
CALCIUM SERPL-MCNC: 8.3 MG/DL (ref 8.5–10.1)
CHLORIDE SERPL-SCNC: 101 MEQ/L (ref 98–107)
CREAT SERPL-MCNC: 0.92 MG/DL (ref 0.6–1.3)
EOSINOPHIL # BLD: 0.6 TH/MM3 (ref 0–0.4)
EOSINOPHIL NFR BLD: 11.1 % (ref 0–4)
ERYTHROCYTE [DISTWIDTH] IN BLOOD BY AUTOMATED COUNT: 16.2 % (ref 11.6–17.2)
GFR SERPLBLD BASED ON 1.73 SQ M-ARVRAT: 77 ML/MIN (ref 89–?)
GLUCOSE SERPL-MCNC: 105 MG/DL (ref 74–106)
HCO3 BLD-SCNC: 33 MEQ/L (ref 21–32)
HCT VFR BLD CALC: 34.7 % (ref 39–51)
HGB BLD-MCNC: 11.7 GM/DL (ref 13–17)
LYMPHOCYTES # BLD AUTO: 1.1 TH/MM3 (ref 1–4.8)
LYMPHOCYTES NFR BLD AUTO: 20.6 % (ref 9–44)
MCH RBC QN AUTO: 31.1 PG (ref 27–34)
MCHC RBC AUTO-ENTMCNC: 33.8 % (ref 32–36)
MCV RBC AUTO: 91.9 FL (ref 80–100)
MONOCYTE #: 0.6 TH/MM3 (ref 0–0.9)
MONOCYTES NFR BLD: 10.3 % (ref 0–8)
NEUTROPHILS # BLD AUTO: 3.2 TH/MM3 (ref 1.8–7.7)
NEUTROPHILS NFR BLD AUTO: 57.5 % (ref 16–70)
PLATELET # BLD: 223 TH/MM3 (ref 150–450)
PMV BLD AUTO: 7.8 FL (ref 7–11)
PROT SERPL-MCNC: 6.8 GM/DL (ref 6.4–8.2)
RBC # BLD AUTO: 3.77 MIL/MM3 (ref 4.5–5.9)
SODIUM SERPL-SCNC: 139 MEQ/L (ref 136–145)
WBC # BLD AUTO: 5.5 TH/MM3 (ref 4–11)

## 2018-02-04 PROCEDURE — 4B02XSZ MEASUREMENT OF CARDIAC PACEMAKER, EXTERNAL APPROACH: ICD-10-PCS | Performed by: INTERNAL MEDICINE

## 2018-02-04 RX ADMIN — IPRATROPIUM BROMIDE AND ALBUTEROL SULFATE SCH AMPULE: .5; 3 SOLUTION RESPIRATORY (INHALATION) at 04:09

## 2018-02-04 RX ADMIN — CLINDAMYCIN HYDROCHLORIDE SCH MG: 150 CAPSULE ORAL at 09:15

## 2018-02-04 RX ADMIN — CLOPIDOGREL BISULFATE SCH MG: 75 TABLET, FILM COATED ORAL at 09:13

## 2018-02-04 RX ADMIN — POLYETHYLENE GLYCOL 3350 SCH GM: 17 POWDER, FOR SOLUTION ORAL at 09:16

## 2018-02-04 RX ADMIN — ATORVASTATIN CALCIUM SCH MG: 10 TABLET, FILM COATED ORAL at 20:59

## 2018-02-04 RX ADMIN — Medication SCH ML: at 21:01

## 2018-02-04 RX ADMIN — APIXABAN SCH MG: 2.5 TABLET, FILM COATED ORAL at 21:00

## 2018-02-04 RX ADMIN — INSULIN ASPART SCH: 100 INJECTION, SOLUTION INTRAVENOUS; SUBCUTANEOUS at 21:02

## 2018-02-04 RX ADMIN — CLINDAMYCIN HYDROCHLORIDE SCH MG: 150 CAPSULE ORAL at 16:59

## 2018-02-04 RX ADMIN — INSULIN ASPART SCH: 100 INJECTION, SOLUTION INTRAVENOUS; SUBCUTANEOUS at 05:43

## 2018-02-04 RX ADMIN — FOLIC ACID SCH MG: 1 TABLET ORAL at 09:13

## 2018-02-04 RX ADMIN — STANDARDIZED SENNA CONCENTRATE AND DOCUSATE SODIUM SCH TAB: 8.6; 5 TABLET, FILM COATED ORAL at 09:00

## 2018-02-04 RX ADMIN — LEVOTHYROXINE SODIUM SCH MCG: 25 TABLET ORAL at 05:43

## 2018-02-04 RX ADMIN — ASPIRIN SCH MG: 81 TABLET ORAL at 09:13

## 2018-02-04 RX ADMIN — FAMOTIDINE SCH MG: 20 TABLET, FILM COATED ORAL at 09:14

## 2018-02-04 RX ADMIN — ACYCLOVIR SCH UNITS: 800 TABLET ORAL at 21:00

## 2018-02-04 RX ADMIN — FAMOTIDINE SCH MG: 20 TABLET, FILM COATED ORAL at 21:00

## 2018-02-04 RX ADMIN — Medication SCH ML: at 09:00

## 2018-02-04 RX ADMIN — CHLORHEXIDINE GLUCONATE SCH PACK: 500 CLOTH TOPICAL at 04:00

## 2018-02-04 RX ADMIN — INSULIN ASPART SCH: 100 INJECTION, SOLUTION INTRAVENOUS; SUBCUTANEOUS at 16:59

## 2018-02-04 RX ADMIN — STANDARDIZED SENNA CONCENTRATE AND DOCUSATE SODIUM SCH TAB: 8.6; 5 TABLET, FILM COATED ORAL at 20:59

## 2018-02-04 RX ADMIN — IPRATROPIUM BROMIDE AND ALBUTEROL SULFATE SCH AMPULE: .5; 3 SOLUTION RESPIRATORY (INHALATION) at 21:07

## 2018-02-04 RX ADMIN — IPRATROPIUM BROMIDE AND ALBUTEROL SULFATE SCH AMPULE: .5; 3 SOLUTION RESPIRATORY (INHALATION) at 09:14

## 2018-02-04 RX ADMIN — CLINDAMYCIN HYDROCHLORIDE SCH MG: 150 CAPSULE ORAL at 12:22

## 2018-02-04 RX ADMIN — ENOXAPARIN SODIUM SCH MG: 40 INJECTION SUBCUTANEOUS at 12:22

## 2018-02-04 RX ADMIN — CYANOCOBALAMIN TAB 1000 MCG SCH MCG: 1000 TAB at 09:15

## 2018-02-04 RX ADMIN — IPRATROPIUM BROMIDE AND ALBUTEROL SULFATE SCH AMPULE: .5; 3 SOLUTION RESPIRATORY (INHALATION) at 15:16

## 2018-02-04 RX ADMIN — INSULIN ASPART SCH: 100 INJECTION, SOLUTION INTRAVENOUS; SUBCUTANEOUS at 12:22

## 2018-02-04 NOTE — HHI.PR
Objective


Vitals





Vital Signs








  Date Time  Temp Pulse Resp B/P (MAP) Pulse Ox O2 Delivery O2 Flow Rate FiO2


 


2/4/18 10:00  72      


 


2/4/18 09:15     95 Nasal Cannula 2.00 


 


2/4/18 08:00  62      


 


2/4/18 08:00 98.4 62 14 132/63 (86) 96   


 


2/4/18 07:00     96 Nasal Cannula 2.00 


 


2/4/18 06:00  60      


 


2/4/18 04:00  67      


 


2/4/18 04:00 98.4 67 22 173/77 (109) 97   


 


2/4/18 02:00  63      


 


2/4/18 00:00 97.7 69 20 153/78 (103) 97   


 


2/4/18 00:00  69      


 


2/3/18 22:00  64      


 


2/3/18 20:55     100 Nasal Cannula 2.00 


 


2/3/18 20:00 97.4 63 22 159/74 (102) 98   


 


2/3/18 20:00  72      


 


2/3/18 20:00     98 Nasal Cannula 2.00 


 


2/3/18 18:00  71      


 


2/3/18 16:27     100 Nasal Cannula 2.00 


 


2/3/18 16:00  63      


 


2/3/18 16:00 97.8 63 13 135/60 (85) 100   


 


2/3/18 14:00  62      














I/O      


 


 2/3/18 2/3/18 2/3/18 2/4/18 2/4/18 2/4/18





 07:00 15:00 23:00 07:00 15:00 23:00


 


Intake Total   660 ml 360 ml  


 


Output Total   375 ml 500 ml  


 


Balance   285 ml -140 ml  


 


      


 


Intake Oral   660 ml 360 ml  


 


Output Urine Total   375 ml 500 ml  


 


# Bowel Movements   0 0  








Result Diagram:  


2/4/18 0400                                                                    

            2/4/18 0400





Objective Remarks


GENERAL: This is a well-nourished, well-developed patient, in no apparent 

distress.


SKIN: No rashes, warm and dry 


HEAD: Atraumatic. Normocephalic. 


EYES: Pupils equal round and reactive. Extraocular motions intact. No scleral 

icterus. 


ENT: Nose without bleeding, or drainage, Airway patent.


NECK: Trachea midline.  Supple


CARDIOVASCULAR: Regular rate and rhythm without murmurs, gallops, or rubs. 


RESPIRATORY: Fair air entry bilaterally. No wheezes, rales, or rhonchi.  


GASTROINTESTINAL: Abdomen soft, non-tender, nondistended.  Positive bowel sounds


MUSCULOSKELETAL: Extremities without clubbing, cyanosis, or edema.  Pedal 

pulses appreciated


NEUROLOGICAL: Awake and alert.  Moves all extremity.  Normal speech.no focal 

neurological deficit





A/P


Assessment and Plan


Syncope/ altered mental status


Questionable right facial weakness which is now resolved. ? TIA versus 

hypoperfusion secondary to low blood pressure.


Hypotension which is now resolved


Coronary artery disease


previous CABG


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


HTN


COPD


Symptomatic bradycardia


Diabetes


Prostate CA


Hearing loss


Hypothyroidism


TIA





Plan:


Continue neuro checks.  Urology Dr. Matute consulted.  Continue aspirin and 

Plavix.  Unable to do MRI despite compatibility, need 6 weeks after pacemaker 

insertion.  patient is on aspirin and Plavix with evidence of subacute infarct 

may require full anticoagulation though has just had a pacer inserted on 

January 28, 2018 at [facility].  





She cardiology consultation not in favor of full anticoagulation.


On clindamycin possibly for pacer insertion site. 


Continue all previous medications including aspirin and Plavix.


Continue Levemir


Need to clarify reason for methotrexate


Await cardiology eval to clear for Lovenox for DVT prophylaxis in view of 

recent pacer insertion.


Patient can be transferred out of ICU per discussion with Dr. Antunez.  We'll 

consult hospitalist service for further medical management and transfer out of 

ICU later today.


Consult palliative care to assist with deciding goals of therapy in this 89-year

-old with multiple chronic medical problems and evidence of recurrent strokes 

to decide long-term goals of therapy.











Roldan Milligan MD Feb 4, 2018 12:01

## 2018-02-04 NOTE — PD.CARD.PN
Subjective


Subjective Remarks


Pt w/o complaints.





Objective


Medications





Current Medications








 Medications


  (Trade)  Dose


 Ordered  Sig/Roseline


 Route  Start Time


 Stop Time Status Last Admin


 


  (NS Flush)  2 ml  UNSCH  PRN


 IV FLUSH  2/3/18 10:00


     


 


 


  (NS Flush)  2 ml  BID


 IV FLUSH  2/3/18 21:00


    2/4/18 09:00


 


 


  (Tylenol)  650 mg  Q6H  PRN


 PO  2/3/18 10:00


     


 


 


  (Duoneb Neb)  1 ampule  Q4HR NEB  PRN


 INH  2/3/18 10:00


     


 


 


 Miscellaneous


 Information  1  Q361D


 XX  2/3/18 10:00


     


 


 


  (Chlorhexidine


 2% Cloth)  3 pack


 Taper  DAILY@04


 TOP  2/4/18 04:00


 1/31/19 03:59   


 


 


  (Chlorhexidine


 2% Cloth)  3 pack  UNSCH  PRN


 TOP  2/3/18 10:00


     


 


 


  (Dia-Colace)  1 tab  BID


 PO  2/3/18 21:00


    2/4/18 09:00


 


 


  (Milk Of


 Magnesia Liq)  30 ml  Q12H  PRN


 PO  2/3/18 10:00


     


 


 


  (Senokot)  17.2 mg  Q12H  PRN


 PO  2/3/18 10:00


     


 


 


  (Dulcolax Supp)  10 mg  DAILY  PRN


 RECTAL  2/3/18 10:00


     


 


 


  (Lactulose Liq)  30 ml  DAILY  PRN


 PO  2/3/18 10:00


     


 


 


  (Lovenox Inj)  40 mg  Q24H


 SQ  2/3/18 12:00


    2/3/18 12:40


 


 


  (Tylenol)  650 mg  Q4H  PRN


 PO  2/3/18 11:30


     


 


 


  (Proair Hfa Inh)  2 puff  Q4HR  PRN


 INH  2/3/18 11:30


     


 


 


  (Norvasc)  5 mg  DAILY


 PO  2/4/18 09:00


    2/4/18 09:14


 


 


  (Ecotrin Ec)  81 mg  DAILY


 PO  2/3/18 11:30


    2/4/18 09:13


 


 


  (Lipitor)  10 mg  HS


 PO  2/3/18 21:00


    2/3/18 21:15


 


 


  (Cleocin)  300 mg  TID


 PO  2/3/18 13:00


    2/4/18 09:15


 


 


  (Plavix)  75 mg  DAILY


 PO  2/3/18 11:30


    2/4/18 09:13


 


 


  (Vitamin B12)  1,000 mcg  DAILY


 PO  2/4/18 09:00


    2/4/18 09:15


 


 


  (Pepcid)  20 mg  BID


 PO  2/3/18 21:00


    2/4/18 09:14


 


 


  (Folate)  1 mg  DAILY


 PO  2/4/18 09:00


    2/4/18 09:13


 


 


  (Levemir Inj)  7 units  HS


 SQ  2/3/18 21:00


    2/3/18 21:15


 


 


  (Duoneb Neb)  1 ampule  Q6HR  NEB


 INH  2/3/18 16:00


    2/4/18 09:14


 


 


  (Synthroid)  25 mcg  DAILY@0600


 PO  2/4/18 06:00


    2/4/18 05:43


 


 


  (Rheumatrex)  10 mg  Q7D


 PO  2/3/18 13:00


    2/3/18 12:54


 


 


  (Miralax)  17 gm  DAILY


 PO  2/4/18 09:00


    2/4/18 09:16


 


 


  (Zofran  Odt)  4 mg  Q4H  PRN


 PO  2/3/18 12:15


     


 


 


  (NovoLOG


 SUPPLEMENTAL


 SCALE)  1  ACHS


 SQ  2/3/18 12:00


    2/3/18 21:00


 


 


  (D50w (Vial) Inj)  25 ml  UNSCH  PRN


 IV  2/3/18 11:45


     


 


 


  (Glucagon Inj)  1 mg  UNSCH  PRN


 IM/SQ  2/3/18 11:45


     


 








Vital Signs / I&O





Vital Signs








  Date Time  Temp Pulse Resp B/P (MAP) Pulse Ox O2 Delivery O2 Flow Rate FiO2


 


2/4/18 10:00  72      


 


2/4/18 09:15     95 Nasal Cannula 2.00 


 


2/4/18 08:00  62      


 


2/4/18 08:00 98.4 62 14 132/63 (86) 96   


 


2/4/18 07:00     96 Nasal Cannula 2.00 


 


2/4/18 06:00  60      


 


2/4/18 04:00  67      


 


2/4/18 04:00 98.4 67 22 173/77 (109) 97   


 


2/4/18 02:00  63      


 


2/4/18 00:00 97.7 69 20 153/78 (103) 97   


 


2/4/18 00:00  69      


 


2/3/18 22:00  64      


 


2/3/18 20:55     100 Nasal Cannula 2.00 


 


2/3/18 20:00 97.4 63 22 159/74 (102) 98   


 


2/3/18 20:00  72      


 


2/3/18 20:00     98 Nasal Cannula 2.00 


 


2/3/18 18:00  71      


 


2/3/18 16:27     100 Nasal Cannula 2.00 


 


2/3/18 16:00  63      


 


2/3/18 16:00 97.8 63 13 135/60 (85) 100   


 


2/3/18 14:00  62      


 


2/3/18 12:00 98.0 63 18 97/52 (67) 100   


 


2/3/18 12:00  63      














I/O      


 


 2/3/18 2/3/18 2/3/18 2/4/18 2/4/18 2/4/18





 07:00 15:00 23:00 07:00 15:00 23:00


 


Intake Total   660 ml 360 ml  


 


Output Total   375 ml 500 ml  


 


Balance   285 ml -140 ml  


 


      


 


Intake Oral   660 ml 360 ml  


 


Output Urine Total   375 ml 500 ml  


 


# Bowel Movements   0 0  








Physical Exam


GENERAL: This is a well-nourished, well-developed patient, in no apparent 

distress.


CARDIOVASCULAR: Regular rate and rhythm without murmurs, gallops, or rubs. 


RESPIRATORY: Clear to auscultation. Breath sounds equal bilaterally. No wheezes

, rales, or rhonchi.  


GASTROINTESTINAL: Abdomen soft, non-tender, nondistended. Normal active bowel 

sounds


MUSCULOSKELETAL: Extremities without clubbing, cyanosis, or edema.


NEURO:  Alert & Oriented x4 to person, place, time, situation.  Moves all ext x4


Laboratory





Laboratory Tests








Test


  2/4/18


04:00


 


White Blood Count 5.5 TH/MM3 


 


Red Blood Count 3.77 MIL/MM3 


 


Hemoglobin 11.7 GM/DL 


 


Hematocrit 34.7 % 


 


Mean Corpuscular Volume 91.9 FL 


 


Mean Corpuscular Hemoglobin 31.1 PG 


 


Mean Corpuscular Hemoglobin


Concent 33.8 % 


 


 


Red Cell Distribution Width 16.2 % 


 


Platelet Count 223 TH/MM3 


 


Mean Platelet Volume 7.8 FL 


 


Neutrophils (%) (Auto) 57.5 % 


 


Lymphocytes (%) (Auto) 20.6 % 


 


Monocytes (%) (Auto) 10.3 % 


 


Eosinophils (%) (Auto) 11.1 % 


 


Basophils (%) (Auto) 0.5 % 


 


Neutrophils # (Auto) 3.2 TH/MM3 


 


Lymphocytes # (Auto) 1.1 TH/MM3 


 


Monocytes # (Auto) 0.6 TH/MM3 


 


Eosinophils # (Auto) 0.6 TH/MM3 


 


Basophils # (Auto) 0.0 TH/MM3 


 


CBC Comment DIFF FINAL 


 


Differential Comment  


 


Blood Urea Nitrogen 17 MG/DL 


 


Creatinine 0.92 MG/DL 


 


Random Glucose 105 MG/DL 


 


Total Protein 6.8 GM/DL 


 


Albumin 2.5 GM/DL 


 


Calcium Level 8.3 MG/DL 


 


Alkaline Phosphatase 88 U/L 


 


Aspartate Amino Transf


(AST/SGOT) 21 U/L 


 


 


Alanine Aminotransferase


(ALT/SGPT) 16 U/L 


 


 


Total Bilirubin 0.5 MG/DL 


 


Sodium Level 139 MEQ/L 


 


Potassium Level 3.8 MEQ/L 


 


Chloride Level 101 MEQ/L 


 


Carbon Dioxide Level 33.0 MEQ/L 


 


Anion Gap 5 MEQ/L 


 


Estimat Glomerular Filtration


Rate 77 ML/MIN 


 











Assessment and Plan


Problem List:  


(1) Acute embolic stroke within last 8 weeks


Status:  Acute


Plan:  Pacemaker interrogation shows 2 very brief episodes of afib, < 30 seconds

, though the device is very new so certainly could have a more substantial 

burden.  Given CVA, I think it is reasonable to change plavix/asa to full 

anticoagulatin with eliquis/warfarin etc but will defer timing to neurology 

given possible acute aspect of the cva. With his weight and Cr., he would still 

qualify for full dose eliquis at 5mg bid despite his advanced age.





(2) Dementia


ICD Codes:  F03.90 - Unspecified dementia without behavioral disturbance


Status:  Chronic


Assessment and Plan


Will be available as needed and would be happy to see in my office, pls call 

with questions.











Adelso Joyner MD Feb 4, 2018 11:12

## 2018-02-04 NOTE — HHI.PR
Review/Management


Diagnosis/Plan:  


(1) Acute embolic stroke within last 8 weeks


Status:  Acute


Plan:  possible new/subacute infarct vs syncopal episode, ? watch for SIRS/

sepsis


neuro improved with mild dysarthria- ?baseline, dehydration. not tpa candidate 

for minimal nihss, resolved symptoms, recent pacemaker insertion


ct brain showing small infarct- unknown time. was noted to be confused on 

admission and at [facility]- may have had an infarct there





pt with multiple cardiac morbidities





recs


afib noted on pacemaker; cardiology suggests OAC


can start low dose eliquis 2.5 bid x 1 weeks, then full dose. s/b d/w pt. 

agrees with tx. understands no reversal agent, bleeding risk


p.t.





can [facility] back [facility]





(2) H/O heart artery stent


ICD Codes:  Z95.5 - Presence of coronary angioplasty implant and graft


Status:  Chronic


(3) Congestive heart failure


ICD Codes:  I50.9 - Heart failure, unspecified


Status:  Chronic


(4) Dementia


ICD Codes:  F03.90 - Unspecified dementia without behavioral disturbance


Status:  Chronic


(5) Syncope


ICD Codes:  R55 - Syncope and collapse


Status:  Acute


(6) Frequent falls


ICD Codes:  R29.6 - Repeated falls


Status:  Acute


(7) FH: CABG (coronary artery bypass surgery)


ICD Codes:  Z84.89 - Family history of other specified conditions





Subjective


Subjective Comments


No acute events reported


No headache


No chest pain


No dyspnea


Active Medications





Current Medications








 Medications


  (Trade)  Dose


 Ordered  Sig/Roseline


 Route  Start Time


 Stop Time Status Last Admin


 


  (NS Flush)  2 ml  UNSCH  PRN


 IV FLUSH  2/3/18 10:00


     


 


 


  (NS Flush)  2 ml  BID


 IV FLUSH  2/3/18 21:00


    2/4/18 09:00


 


 


  (Tylenol)  650 mg  Q6H  PRN


 PO  2/3/18 10:00


     


 


 


  (Duoneb Neb)  1 ampule  Q4HR NEB  PRN


 INH  2/3/18 10:00


     


 


 


 Miscellaneous


 Information  1  Q361D


 XX  2/3/18 10:00


     


 


 


  (Chlorhexidine


 2% Cloth)  3 pack


 Taper  DAILY@04


 TOP  2/4/18 04:00


 1/31/19 03:59   


 


 


  (Chlorhexidine


 2% Cloth)  3 pack  UNSCH  PRN


 TOP  2/3/18 10:00


     


 


 


  (Dia-Colace)  1 tab  BID


 PO  2/3/18 21:00


    2/4/18 09:00


 


 


  (Milk Of


 Magnesia Liq)  30 ml  Q12H  PRN


 PO  2/3/18 10:00


     


 


 


  (Senokot)  17.2 mg  Q12H  PRN


 PO  2/3/18 10:00


     


 


 


  (Dulcolax Supp)  10 mg  DAILY  PRN


 RECTAL  2/3/18 10:00


     


 


 


  (Lactulose Liq)  30 ml  DAILY  PRN


 PO  2/3/18 10:00


     


 


 


  (Lovenox Inj)  40 mg  Q24H


 SQ  2/3/18 12:00


    2/4/18 12:22


 


 


  (Tylenol)  650 mg  Q4H  PRN


 PO  2/3/18 11:30


     


 


 


  (Proair Hfa Inh)  2 puff  Q4HR  PRN


 INH  2/3/18 11:30


     


 


 


  (Norvasc)  5 mg  DAILY


 PO  2/4/18 09:00


    2/4/18 09:14


 


 


  (Ecotrin Ec)  81 mg  DAILY


 PO  2/3/18 11:30


    2/4/18 09:13


 


 


  (Lipitor)  10 mg  HS


 PO  2/3/18 21:00


    2/3/18 21:15


 


 


  (Cleocin)  300 mg  TID


 PO  2/3/18 13:00


    2/4/18 12:22


 


 


  (Plavix)  75 mg  DAILY


 PO  2/3/18 11:30


    2/4/18 09:13


 


 


  (Vitamin B12)  1,000 mcg  DAILY


 PO  2/4/18 09:00


    2/4/18 09:15


 


 


  (Pepcid)  20 mg  BID


 PO  2/3/18 21:00


    2/4/18 09:14


 


 


  (Folate)  1 mg  DAILY


 PO  2/4/18 09:00


    2/4/18 09:13


 


 


  (Levemir Inj)  7 units  HS


 SQ  2/3/18 21:00


    2/3/18 21:15


 


 


  (Duoneb Neb)  1 ampule  Q6HR  NEB


 INH  2/3/18 16:00


    2/4/18 09:14


 


 


  (Synthroid)  25 mcg  DAILY@0600


 PO  2/4/18 06:00


    2/4/18 05:43


 


 


  (Rheumatrex)  10 mg  Q7D


 PO  2/3/18 13:00


    2/3/18 12:54


 


 


  (Miralax)  17 gm  DAILY


 PO  2/4/18 09:00


    2/4/18 09:16


 


 


  (Zofran  Odt)  4 mg  Q4H  PRN


 PO  2/3/18 12:15


     


 


 


  (NovoLOG


 SUPPLEMENTAL


 SCALE)  1  ACHS


 SQ  2/3/18 12:00


    2/4/18 12:22


 


 


  (D50w (Vial) Inj)  25 ml  UNSCH  PRN


 IV  2/3/18 11:45


     


 


 


  (Glucagon Inj)  1 mg  UNSCH  PRN


 IM/SQ  2/3/18 11:45


     


 


 


  (Halls Jaclyn)  1 lozenge  UNSCH  PRN


 BUCCAL  2/4/18 13:15


   UNV  


 








Allergies





Allergies


Coded Allergies


  No Known Drug Allergies (Verified Allergy, Unknown, 12/22/17)


Uncoded Allergies


  PACEMAKER UNDER 6 WEEKS ( Adverse Reaction, Severe, PACEMAKER UNDER 6 WEEKS, 

DML 2/3/18, 2/3/18)





Review of Systems


All other ROS:  ROS reviewed as documented in chart





Exam


I&O / VS





Vital Signs








  Date Time  Temp Pulse Resp B/P (MAP) Pulse Ox O2 Delivery O2 Flow Rate FiO2


 


2/4/18 10:00  72      


 


2/4/18 09:15     95 Nasal Cannula 2.00 


 


2/4/18 08:00  62      


 


2/4/18 08:00 98.4 62 14 132/63 (86) 96   


 


2/4/18 07:00     96 Nasal Cannula 2.00 


 


2/4/18 06:00  60      


 


2/4/18 04:00  67      


 


2/4/18 04:00 98.4 67 22 173/77 (109) 97   


 


2/4/18 02:00  63      


 


2/4/18 00:00 97.7 69 20 153/78 (103) 97   


 


2/4/18 00:00  69      


 


2/3/18 22:00  64      


 


2/3/18 20:55     100 Nasal Cannula 2.00 


 


2/3/18 20:00 97.4 63 22 159/74 (102) 98   


 


2/3/18 20:00  72      


 


2/3/18 20:00     98 Nasal Cannula 2.00 


 


2/3/18 18:00  71      


 


2/3/18 16:27     100 Nasal Cannula 2.00 


 


2/3/18 16:00  63      


 


2/3/18 16:00 97.8 63 13 135/60 (85) 100   


 


2/3/18 14:00  62      








General:  Alert and Oriented, No acute distress


Respiratory:  Lungs CTA, Non-labored respirations, BS equal, Symmetrical 

expansion, Other


Cardiology:  Normal rate, No murmur, Intact pulses, Regular Rhythm, Other


Musculoskeletal:  ROM, Swelling, No calf tenderness


Neurologic:  Alert, Oriented, Normal motor, CN II-XII intact, Normal DTR's


Psychiatric:  Cooperative, Appropriate mood & affect


Exam Comments


alerts, ox 2-3, more conversant and appropriate, repeats, names, follows, mild 

dysarthric speech, eomi, face sym, ou 3mm sluggish, tam to gravity but left ue 

in brace 2/2 recent pacemaker insertion, no clonus, planterflexor





Objective


Micro and Labs





Laboratory Tests








Test


  2/4/18


04:00


 


White Blood Count 5.5 


 


Red Blood Count 3.77 


 


Hemoglobin 11.7 


 


Hematocrit 34.7 


 


Mean Corpuscular Volume 91.9 


 


Mean Corpuscular Hemoglobin 31.1 


 


Mean Corpuscular Hemoglobin


Concent 33.8 


 


 


Red Cell Distribution Width 16.2 


 


Platelet Count 223 


 


Mean Platelet Volume 7.8 


 


Neutrophils (%) (Auto) 57.5 


 


Lymphocytes (%) (Auto) 20.6 


 


Monocytes (%) (Auto) 10.3 


 


Eosinophils (%) (Auto) 11.1 


 


Basophils (%) (Auto) 0.5 


 


Neutrophils # (Auto) 3.2 


 


Lymphocytes # (Auto) 1.1 


 


Monocytes # (Auto) 0.6 


 


Eosinophils # (Auto) 0.6 


 


Basophils # (Auto) 0.0 


 


CBC Comment DIFF FINAL 


 


Differential Comment  


 


Blood Urea Nitrogen 17 


 


Creatinine 0.92 


 


Random Glucose 105 


 


Total Protein 6.8 


 


Albumin 2.5 


 


Calcium Level 8.3 


 


Alkaline Phosphatase 88 


 


Aspartate Amino Transf


(AST/SGOT) 21 


 


 


Alanine Aminotransferase


(ALT/SGPT) 16 


 


 


Total Bilirubin 0.5 


 


Sodium Level 139 


 


Potassium Level 3.8 


 


Chloride Level 101 


 


Carbon Dioxide Level 33.0 


 


Anion Gap 5 


 


Estimat Glomerular Filtration


Rate 77 


 

















Sheldon Matute MD Feb 4, 2018 13:18

## 2018-02-04 NOTE — HHI.PR
Subjective


Remarks


Patient resting in bed awake alert


He complained of sore throat


Unable to do MRI due to pacemaker newly inserted need 6 weeks apart





Objective


Vitals





Vital Signs








  Date Time  Temp Pulse Resp B/P (MAP) Pulse Ox O2 Delivery O2 Flow Rate FiO2


 


2/4/18 10:00  72      


 


2/4/18 09:15     95 Nasal Cannula 2.00 


 


2/4/18 08:00  62      


 


2/4/18 07:00     96 Nasal Cannula 2.00 


 


2/4/18 06:00  60      


 


2/4/18 04:00  67      


 


2/4/18 04:00 98.4 67 22 173/77 (109) 97   


 


2/4/18 02:00  63      


 


2/4/18 00:00 97.7 69 20 153/78 (103) 97   


 


2/4/18 00:00  69      


 


2/3/18 22:00  64      


 


2/3/18 20:55     100 Nasal Cannula 2.00 


 


2/3/18 20:00 97.4 63 22 159/74 (102) 98   


 


2/3/18 20:00  72      


 


2/3/18 20:00     98 Nasal Cannula 2.00 


 


2/3/18 18:00  71      


 


2/3/18 16:27     100 Nasal Cannula 2.00 


 


2/3/18 16:00  63      


 


2/3/18 16:00 97.8 63 13 135/60 (85) 100   


 


2/3/18 14:00  62      


 


2/3/18 12:00 98.0 63 18 97/52 (67) 100   


 


2/3/18 12:00  63      














I/O      


 


 2/3/18 2/3/18 2/3/18 2/4/18 2/4/18 2/4/18





 07:00 15:00 23:00 07:00 15:00 23:00


 


Intake Total   660 ml 360 ml  


 


Output Total   375 ml 500 ml  


 


Balance   285 ml -140 ml  


 


      


 


Intake Oral   660 ml 360 ml  


 


Output Urine Total   375 ml 500 ml  


 


# Bowel Movements   0 0  








Result Diagram:  


2/4/18 0400 2/4/18 0400





Objective Remarks


GENERAL: This is a well-nourished, well-developed patient, in no apparent 

distress.


SKIN: No rashes, warm and dry 


HEAD: Atraumatic. Normocephalic. 


EYES: Pupils equal round and reactive. Extraocular motions intact. No scleral 

icterus. 


ENT: Nose without bleeding, or drainage, Airway patent.


NECK: Trachea midline.  Supple


CARDIOVASCULAR: Regular rate and rhythm without murmurs, gallops, or rubs. 


RESPIRATORY: Fair air entry bilaterally. No wheezes, rales, or rhonchi.  


GASTROINTESTINAL: Abdomen soft, non-tender, nondistended.  Positive bowel sounds


MUSCULOSKELETAL: Extremities without clubbing, cyanosis, or edema.  Pedal 

pulses appreciated


NEUROLOGICAL: Awake and alert.  Moves all extremity.  Normal speech.no focal 

neurological deficit





A/P


Assessment and Plan





A/P:


Syncope/ altered mental status


Questionable right facial weakness which is now resolved. ? TIA versus 

hypoperfusion secondary to low blood pressure.


Hypotension which is now resolved


Coronary artery disease


previous CABG


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


HTN


COPD


Symptomatic bradycardia


Diabetes


Prostate CA


Hearing loss


Hypothyroidism


TIA





Plan:


Continue neuro checks. Neurology Dr. Matute consulted.  Continue aspirin and 

Plavix.  Unable to do MRI despite compatibility, need 6 weeks after pacemaker 

insertion.  patient is on aspirin and Plavix with evidence of subacute infarct 

may require full anticoagulation though has just had a pacer inserted on 

January 28, 2018 at [facility].  





Appreciate cardiology consultation they are not in favor of full 

anticoagulation.


On clindamycin possibly for pacer insertion site. 


Continue all previous medications including aspirin and Plavix.


Continue Levemir


Need to clarify reason for methotrexate


Sore throat we'll give lozenge


Consult palliative care to assist with deciding goals of therapy in this 89-year

-old with multiple chronic medical problems and evidence of recurrent strokes 

to decide long-term goals of therapy.











Roldan Milligan MD Feb 4, 2018 11:04

## 2018-02-05 VITALS
DIASTOLIC BLOOD PRESSURE: 78 MMHG | OXYGEN SATURATION: 94 % | TEMPERATURE: 98 F | HEART RATE: 72 BPM | RESPIRATION RATE: 18 BRPM | SYSTOLIC BLOOD PRESSURE: 144 MMHG

## 2018-02-05 VITALS
DIASTOLIC BLOOD PRESSURE: 82 MMHG | TEMPERATURE: 98.1 F | HEART RATE: 85 BPM | RESPIRATION RATE: 18 BRPM | OXYGEN SATURATION: 93 % | SYSTOLIC BLOOD PRESSURE: 188 MMHG

## 2018-02-05 VITALS
OXYGEN SATURATION: 98 % | DIASTOLIC BLOOD PRESSURE: 70 MMHG | HEART RATE: 75 BPM | RESPIRATION RATE: 18 BRPM | SYSTOLIC BLOOD PRESSURE: 140 MMHG | TEMPERATURE: 98.4 F

## 2018-02-05 VITALS
TEMPERATURE: 98.3 F | OXYGEN SATURATION: 93 % | RESPIRATION RATE: 18 BRPM | DIASTOLIC BLOOD PRESSURE: 84 MMHG | HEART RATE: 75 BPM | SYSTOLIC BLOOD PRESSURE: 178 MMHG

## 2018-02-05 RX ADMIN — IPRATROPIUM BROMIDE AND ALBUTEROL SULFATE SCH AMPULE: .5; 3 SOLUTION RESPIRATORY (INHALATION) at 07:35

## 2018-02-05 RX ADMIN — CHLORHEXIDINE GLUCONATE SCH PACK: 500 CLOTH TOPICAL at 04:00

## 2018-02-05 RX ADMIN — IPRATROPIUM BROMIDE AND ALBUTEROL SULFATE SCH AMPULE: .5; 3 SOLUTION RESPIRATORY (INHALATION) at 03:35

## 2018-02-05 RX ADMIN — STANDARDIZED SENNA CONCENTRATE AND DOCUSATE SODIUM SCH TAB: 8.6; 5 TABLET, FILM COATED ORAL at 10:47

## 2018-02-05 RX ADMIN — CLINDAMYCIN HYDROCHLORIDE SCH MG: 150 CAPSULE ORAL at 12:51

## 2018-02-05 RX ADMIN — INSULIN ASPART SCH: 100 INJECTION, SOLUTION INTRAVENOUS; SUBCUTANEOUS at 12:50

## 2018-02-05 RX ADMIN — IPRATROPIUM BROMIDE AND ALBUTEROL SULFATE SCH AMPULE: .5; 3 SOLUTION RESPIRATORY (INHALATION) at 15:17

## 2018-02-05 RX ADMIN — CLINDAMYCIN HYDROCHLORIDE SCH MG: 150 CAPSULE ORAL at 17:43

## 2018-02-05 RX ADMIN — INSULIN ASPART SCH: 100 INJECTION, SOLUTION INTRAVENOUS; SUBCUTANEOUS at 08:00

## 2018-02-05 RX ADMIN — FOLIC ACID SCH MG: 1 TABLET ORAL at 10:48

## 2018-02-05 RX ADMIN — INSULIN ASPART SCH: 100 INJECTION, SOLUTION INTRAVENOUS; SUBCUTANEOUS at 17:00

## 2018-02-05 RX ADMIN — APIXABAN SCH MG: 2.5 TABLET, FILM COATED ORAL at 10:47

## 2018-02-05 RX ADMIN — ASPIRIN SCH MG: 81 TABLET ORAL at 10:47

## 2018-02-05 RX ADMIN — Medication SCH ML: at 10:47

## 2018-02-05 RX ADMIN — POLYETHYLENE GLYCOL 3350 SCH GM: 17 POWDER, FOR SOLUTION ORAL at 09:00

## 2018-02-05 RX ADMIN — FAMOTIDINE SCH MG: 20 TABLET, FILM COATED ORAL at 10:49

## 2018-02-05 RX ADMIN — CYANOCOBALAMIN TAB 1000 MCG SCH MCG: 1000 TAB at 10:48

## 2018-02-05 RX ADMIN — LEVOTHYROXINE SODIUM SCH MCG: 25 TABLET ORAL at 06:21

## 2018-02-05 RX ADMIN — CLINDAMYCIN HYDROCHLORIDE SCH MG: 150 CAPSULE ORAL at 10:48

## 2018-02-05 NOTE — HHI.PR
Review/Management


Diagnosis/Plan:  


(1) Acute embolic stroke within last 8 weeks


Status:  Acute


Plan:  possible new/subacute infarct vs syncopal episode, ? watch for SIRS/

sepsis


neuro improved with mild dysarthria- ?baseline, dehydration. not tpa candidate 

for minimal nihss, resolved symptoms, recent pacemaker insertion


ct brain showing small infarct- unknown time. was noted to be confused on 

admission and at [facility]- may have had an infarct there





pt with multiple cardiac morbidities





recs


probable sundowning





seroquel bid prn for confusion





can go back to [facility]





(2) H/O heart artery stent


ICD Codes:  Z95.5 - Presence of coronary angioplasty implant and graft


Status:  Chronic


(3) Congestive heart failure


ICD Codes:  I50.9 - Heart failure, unspecified


Status:  Chronic


(4) Dementia


ICD Codes:  F03.90 - Unspecified dementia without behavioral disturbance


Status:  Chronic


(5) Syncope


ICD Codes:  R55 - Syncope and collapse


Status:  Acute


(6) Frequent falls


ICD Codes:  R29.6 - Repeated falls


Status:  Acute


(7) FH: CABG (coronary artery bypass surgery)


ICD Codes:  Z84.89 - Family history of other specified conditions





Subjective


Subjective Comments


some confusion overnight


No headache


No chest pain


No dyspnea


Active Medications





Current Medications








 Medications


  (Trade)  Dose


 Ordered  Sig/Roseline


 Route  Start Time


 Stop Time Status Last Admin


 


  (NS Flush)  2 ml  UNSCH  PRN


 IV FLUSH  2/3/18 10:00


     


 


 


  (NS Flush)  2 ml  BID


 IV FLUSH  2/3/18 21:00


    2/4/18 21:01


 


 


  (Tylenol)  650 mg  Q6H  PRN


 PO  2/3/18 10:00


     


 


 


  (Duoneb Neb)  1 ampule  Q4HR NEB  PRN


 INH  2/3/18 10:00


     


 


 


 Miscellaneous


 Information  1  Q361D


 XX  2/3/18 10:00


     


 


 


  (Chlorhexidine


 2% Cloth)  3 pack


 Taper  DAILY@04


 TOP  2/4/18 04:00


 1/31/19 03:59   


 


 


  (Chlorhexidine


 2% Cloth)  3 pack  UNSCH  PRN


 TOP  2/3/18 10:00


     


 


 


  (Dia-Colace)  1 tab  BID


 PO  2/3/18 21:00


    2/4/18 20:59


 


 


  (Milk Of


 Magnesia Liq)  30 ml  Q12H  PRN


 PO  2/3/18 10:00


     


 


 


  (Senokot)  17.2 mg  Q12H  PRN


 PO  2/3/18 10:00


     


 


 


  (Dulcolax Supp)  10 mg  DAILY  PRN


 RECTAL  2/3/18 10:00


     


 


 


  (Lactulose Liq)  30 ml  DAILY  PRN


 PO  2/3/18 10:00


     


 


 


  (Tylenol)  650 mg  Q4H  PRN


 PO  2/3/18 11:30


     


 


 


  (Proair Hfa Inh)  2 puff  Q4HR  PRN


 INH  2/3/18 11:30


     


 


 


  (Norvasc)  5 mg  DAILY


 PO  2/4/18 09:00


    2/4/18 09:14


 


 


  (Ecotrin Ec)  81 mg  DAILY


 PO  2/3/18 11:30


    2/4/18 09:13


 


 


  (Lipitor)  10 mg  HS


 PO  2/3/18 21:00


    2/4/18 20:59


 


 


  (Cleocin)  300 mg  TID


 PO  2/3/18 13:00


    2/4/18 16:59


 


 


  (Vitamin B12)  1,000 mcg  DAILY


 PO  2/4/18 09:00


    2/4/18 09:15


 


 


  (Pepcid)  20 mg  BID


 PO  2/3/18 21:00


    2/4/18 21:00


 


 


  (Folate)  1 mg  DAILY


 PO  2/4/18 09:00


    2/4/18 09:13


 


 


  (Levemir Inj)  7 units  HS


 SQ  2/3/18 21:00


    2/4/18 21:00


 


 


  (Duoneb Neb)  1 ampule  Q6HR  NEB


 INH  2/3/18 16:00


    2/5/18 07:35


 


 


  (Synthroid)  25 mcg  DAILY@0600


 PO  2/4/18 06:00


    2/5/18 06:21


 


 


  (Rheumatrex)  10 mg  Q7D


 PO  2/3/18 13:00


    2/3/18 12:54


 


 


  (Miralax)  17 gm  DAILY


 PO  2/4/18 09:00


    2/4/18 09:16


 


 


  (Zofran  Odt)  4 mg  Q4H  PRN


 PO  2/3/18 12:15


     


 


 


  (NovoLOG


 SUPPLEMENTAL


 SCALE)  1  ACHS


 SQ  2/3/18 12:00


    2/4/18 21:02


 


 


  (D50w (Vial) Inj)  25 ml  UNSCH  PRN


 IV  2/3/18 11:45


     


 


 


  (Glucagon Inj)  1 mg  UNSCH  PRN


 IM/SQ  2/3/18 11:45


     


 


 


  (Halls Jaclyn)  1 lozenge  UNSCH  PRN


 BUCCAL  2/4/18 13:15


    2/4/18 14:52


 


 


  (Eliquis)  2.5 mg  BID


 PO  2/4/18 21:00


    2/4/18 21:00


 








Allergies





Allergies


Coded Allergies


  No Known Drug Allergies (Verified Allergy, Unknown, 12/22/17)


Uncoded Allergies


  PACEMAKER UNDER 6 WEEKS ( Adverse Reaction, Severe, PACEMAKER UNDER 6 WEEKS, 

DML 2/3/18, 2/3/18)





Review of Systems


All other ROS:  ROS reviewed as documented in chart





Exam


I&O / VS





Vital Signs








  Date Time  Temp Pulse Resp B/P (MAP) Pulse Ox O2 Delivery O2 Flow Rate FiO2


 


2/5/18 07:38      Nasal Cannula 2.00 


 


2/5/18 06:06 98.3 75 18 178/84 (115) 93   


 


2/5/18 05:37     93 Room Air  


 


2/5/18 01:55 98.1 85 18 188/82 (117) 93   


 


2/4/18 20:48 97.7 74 17 155/71 (99) 94   


 


2/4/18 16:00 98.5 73 19 142/65 (90) 98   


 


2/4/18 15:16     95 Nasal Cannula 2.00 


 


2/4/18 14:09     95 Nasal Cannula 2.00 


 


2/4/18 10:00  72      


 


2/4/18 09:15     95 Nasal Cannula 2.00 








General:  Alert and Oriented, No acute distress


Respiratory:  Non-labored respirations, BS equal, Symmetrical expansion, Other


Cardiology:  Normal rate, Intact pulses, Regular Rhythm, Other


Musculoskeletal:  ROM, Swelling, No calf tenderness


Neurologic:  Alert, Oriented, Normal motor, CN II-XII intact, Normal DTR's


Psychiatric:  Cooperative, Appropriate mood & affect


Exam Comments


alerts, ox 1, more conversant and appropriate, repeats, names, follows, mild 

dysarthric speech, eomi, face sym, ou 3mm sluggish, tam to gravity but left ue 

in brace 2/2 recent pacemaker insertion, no clonus, planterflexor











Sheldon Matute MD Feb 5, 2018 08:31

## 2018-02-05 NOTE — HHI.PR
Objective


Vitals





Vital Signs








  Date Time  Temp Pulse Resp B/P (MAP) Pulse Ox O2 Delivery O2 Flow Rate FiO2


 


2/5/18 15:42      Room Air  


 


2/5/18 12:00 98.4 75 18 140/70 (93) 98   


 


2/5/18 08:00 98.0 72 18 144/78 (100) 94   


 


2/5/18 07:38      Nasal Cannula 2.00 


 


2/5/18 06:06 98.3 75 18 178/84 (115) 93   


 


2/5/18 05:37     93 Room Air  


 


2/5/18 01:55 98.1 85 18 188/82 (117) 93   


 


2/4/18 20:48 97.7 74 17 155/71 (99) 94   


 


2/4/18 16:00 98.5 73 19 142/65 (90) 98   














I/O      


 


 2/4/18 2/4/18 2/4/18 2/5/18 2/5/18 2/5/18





 07:00 15:00 23:00 07:00 15:00 23:00


 


Intake Total 360 ml     


 


Output Total 500 ml  100 ml 400 ml  


 


Balance -140 ml  -100 ml -400 ml  


 


      


 


Intake Oral 360 ml     


 


Output Urine Total 500 ml  100 ml 400 ml  


 


# Voids  1    


 


# Bowel Movements 0     








Result Diagram:  


2/4/18 0400                                                                    

            2/4/18 0400





Objective Remarks


GENERAL: This is a well-nourished, well-developed patient, in no apparent 

distress.


SKIN: No rashes, warm and dry 


HEAD: Atraumatic. Normocephalic. 


EYES: Pupils equal round and reactive. Extraocular motions intact. No scleral 

icterus. 


ENT: Nose without bleeding, or drainage, Airway patent.


NECK: Trachea midline.  Supple


CARDIOVASCULAR: Regular rate and rhythm without murmurs, gallops, or rubs. 


RESPIRATORY: Fair air entry bilaterally. No wheezes, rales, or rhonchi.  


GASTROINTESTINAL: Abdomen soft, non-tender, nondistended.  Positive bowel sounds


MUSCULOSKELETAL: Extremities without clubbing, cyanosis, or edema.  Pedal 

pulses appreciated


NEUROLOGICAL: Awake and alert.  Moves all extremity.  Normal speech.no focal 

neurological deficit





A/P


Assessment and Plan





A/P:


Syncope/ altered mental status


Questionable right facial weakness which is now resolved. ? TIA versus 

hypoperfusion secondary to low blood pressure.


Hypotension which is now resolved


Coronary artery disease


previous CABG


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


HTN


COPD


Symptomatic bradycardia


Diabetes


Prostate CA


Hearing loss


Hypothyroidism


TIA





Plan:


Continue neuro checks. Neurology Dr. Matute consulted.  Continue aspirin and 

Plavix.  Unable to do MRI despite compatibility, need 6 weeks after pacemaker 

insertion.  patient is on aspirin and Plavix with evidence of subacute infarct 

may require full anticoagulation though has just had a pacer inserted on 

January 28, 2018 at [facility].  





Appreciate cardiology consultation they are not in favor of full 

anticoagulation.


On clindamycin possibly for pacer insertion site. 


Continue all previous medications including aspirin and Plavix.


Continue Levemir


Need to clarify reason for methotrexate


Sore throat we'll give lozenge


Consult palliative care to assist with deciding goals of therapy in this 89-year

-old with multiple chronic medical problems and evidence of recurrent strokes 

to decide long-term goals of therapy.











Roldan Milligan MD Feb 5, 2018 15:48

## 2018-02-05 NOTE — MG
cc:

GISELLE MORAES MD

****

 

 

Lab No:    Date:  02/03/2018  Age: 89     Sex:  M Race:

 

 

DATE OF BIRTH

1928

 

INDICATIONS

An 89-year-old with a history of mental status changes.

 

 

FINDINGS

Dyssynchronous beta frequencies, occasional delta 20-40 microvolts suggestive

of drowsy state.  During arousals background incremented up to 6-8 Hz.  Drowsy

and stage I sleep.  Occasional brief arousals.

 

Limited driving with photic stimulation.

 

Single lead EKG showing sinus rhythm.

 

INTERPRETATION

Minimal encephalopathy and sleep state.  Clinical correlation.

 

 

                              _________________________________

                              Giselle Moraes MD

 

 

 

MG/SSB

D:  2/4/2018/11:10 PM

T:  2/5/2018/5:22 AM

Visit #:  [id number]

Job #:  [id number]

## 2018-02-05 NOTE — PD.CONS
Consult


Service


Palliative Care


Consult Requested By


Dr. Sinha


.


Primary Care Physician


Unknown in [place]


 .


Reason for Consultation


   a.  To assist with evaluation and management of symptoms including: Agitation

, altered mental status


   b.  To assist medical decision maker(s) with: better understanding of 

current medical conditions; weighing benefits/burdens of medical treatment 

options; making        


        medical treatment decisions.


.





HPI


History of Present Illness


This 89-year-old male, with a past history of COPD, diabetes, and Alzheimer's 

dementia, has been declining over the past several months.  My initial history 

is obtained from the medical records and from the patient, but it is noted that 

the patient is not oriented to place or date, and that he has been confused; 

subsequently, I spoke with the patient's son Asa by telephone.





Because of weakness and unsteady gait during recent weeks, he has reportedly 

had several falls.  The patient had been admitted to [facility] in 

[place] because of bradycardia, falls, and syncope or near syncope last 

month.  He was transferred to [facility] here on 17 and was to 

begin attempted rehab services.  However, the following day, the patient 

developed weakness, was ashen and more confused, and a CODE BLUE was called; he 

did not require ventilatory assistance or CPR, was transferred [place] the [facility] and 

his symptoms seemed to resolve that day.  An echocardiogram revealed an 

ejection fraction of 50-55%, and carotid Dopplers showed obstruction of only 50

% of the internal carotid.  By 18, the patient was transferred back to 

[place].





He he was able to continue therapy and was discharged from the facility on 1/10/

18.





The patient was admitted again to [facility] in [place] on 18

, was reportedly felt to have a second-degree heart block, and had a pacemaker 

placed on 18.  He was subsequently transferred back here to [facility] 

on 18 and started rehabilitation services again.  However, on 18, the 

patient developed near syncope, had fever, and complained of sore throat.  His 

white count was 5.5, hemoglobin 10.7, creatinine 0.92, sodium 139, and albumin 

2.5.  He was transferred back to [facility] on 2/3/18 after he had developed acute 

altered mental status, slurred speech, and was felt to possibly have a right 

facial droop.  CT scan of the brain at that time revealed a possible subacute 

left frontoparietal infarction, and possibly an old right occipital stroke.  By 

the time the patient was transferred to [facility] and was evaluated again, the 

symptoms had resolved.  Further workup for the "Stroke Alert" included CTA of 

the brain and carotids that were essentially unremarkable.  Cardiology 

evaluation revealed some episodes of paroxysmal atrial fibrillation on 18.  

An EEG was done on 18 and was consistent with minimal encephalopathy.  The 

patient's confusion was felt to possibly be representing sundowning.





In recent days, consideration was being given for a return to [place] again.  

Palliative Care was consulted to assist with symptom management, and to enter 

into discussions with patient or decision makers/family regarding his illnesses

, the prognosis, and the benefits and burdens of the various treatment choices.


.


Function/Cognitive Trajectory


The patient has been in and out of the hospital for the past 6 weeks, and has 

been living with his son Asa recently.  The patient says he has been able to 

get around on his own, but he does admit to having several falls; "I think I 

fell 14 times altogether."  The patient's son reports that the patient was 

fairly independent a couple months ago, but that his confusion and weakness 

have worsened since then.


.





Review of Systems


ROS Limitations:  Altered Mental Status (chronic confusion)


Constitutional:  COMPLAINS OF: Fever (one occurrence on 2/3/18)


Endocrine:  DENIES: Polyuria


Eyes:  DENIES: Eye inflammation


Ears, nose, mouth, throat:  DENIES: Epistaxis


Respiratory:  DENIES: Cough, Shortness of breath


Cardiovascular:  COMPLAINS OF: Syncope, DENIES: Chest pain


Gastrointestinal:  DENIES: Bloody stools, Constipation, Diarrhea, Vomiting, 

Vomiting blood


Genitourinary:  DENIES: Hematuria


Musculoskeletal:  DENIES: Back pain, Neck pain


Integumentary:  DENIES: Rash


Hematologic/Lymphatics:  DENIES: Bruising


Immunologic/Allergic:  DENIES: Urticaria


Neurologic:  DENIES: Headache, Localized weakness, Paresthesias, Seizures


Psychiatric:  COMPLAINS OF: Confusion, Agitation (in 4. restraints at this time)

, DENIES: Hallucinations





Past Family Social History


Coded Allergies:  


     No Known Drug Allergies (Verified  Allergy, Unknown, 17)


Uncoded Allergies:  


     PACEMAKER UNDER 6 WEEKS (Adverse Reaction, Severe, PACEMAKER UNDER 6 WEEKS

, DML 2/3/18, 2/3/18)


 PACEMAKER IMPLANTED 18 PER [facility] 3RD FLOOR


Past Medical History


* Altered mental status, recent TIA, history of dementia


* History of Alzheimer's dementia


* Probable TIA 2/3/18


* Recent syncope, recent pacemaker placement, history of paroxysmal atrial 

fibrillation during this hospitalization


* Intermittent atrial fibrillation 18


* COPD


* Diabetes


* History of pneumonia


* Hypertension


* Hyperlipidemia


* Chronic hearing loss


* RA


* Hypothyroid


* History of prostate cancer


.


Past Surgical History


* Left knee replacement years ago


* Kyphoplasties


* CABG


* Laparotomy


* Cardiac stents 5


* Pacemaker 18


.


Reported Medications





Reported Meds & Active Scripts


Active


Norvasc (Amlodipine Besylate) 5 Mg Tab 5 Mg PO DAILY


Famotidine 20 Mg Tab 20 Mg PO BID


Folic Acid 1 Mg Tablet 1 Tab PO DAILY


Lipitor (Atorvastatin Calcium) 10 Mg Tab 10 Mg PO HS


Proair Hfa 8.5 GM Inh (Albuterol Sulfate) 90 Mcg/Act Aer 2 Puff INH Q4-6H PRN 

30 Days


     108 mcg/actuation


Clopidogrel (Clopidogrel Bisulfate) 75 Mg Tab 75 Mg PO DAILY


Synthroid (Levothyroxine Sodium) 25 Mcg Tab 25 Mcg PO DAILY@0600


Aspirin 81 (Aspirin) 81 Mg Tabdr 81 Mg PO DAILY


Reported


Miralax Powder (Polyethylene Glycol 3350 Powder) 17 Gm Powd 17 Gm PO DAILY


     Mix and dissolve one measuring cap-ful (17 grams) in water or juice.


Methotrexate 2.5 Mg Tab 10 Mg PO Q7D


Duoneb (Ipratropium-Albuterol Neb) 0.5-2.5 Mg/3 Ml Neb 1 Nebule INH Q6HR NEB


Levemir Inj (Insulin Detemir) 1,000 unit/ 10 ML Vial 7 Units SQ HS


     Do not mix with any other Insulin.


Clindamycin (Clindamycin HCl) 300 Mg Cap 300 Mg PO TID


Humalog Inj (Insulin Human Lispro) 1,000 Unit/10 Ml Vial 2-7 Units SQ ACHS


     Max dose at bedtime:(  )units; sugars < 70,(0)units; sugars


     150-199,(2)units; sugars 200-249,(4)units; sugars 250-299,(7)units;


     sugars 300-349,(10)units; sugars more than 349,(12)units.


Docusate Sodium Liq (Docusate Sodium) 50 Mg/5 Ml Liq 200 Mg PO BID


Vitamin B-12 (Cyanocobalamin) 1,000 Mcg Tab 1,000 Mcg PO DAILY


Roxicodone (Oxycodone HCl) 5 Mg Tab 5 Mg PO Q6H PRN


Zofran (Ondansetron HCl) 4 Mg Tab 4 Mg PO Q6HR PRN


Tylenol (Acetaminophen) 325 Mg Tab 650 Mg PO Q4H PRN


.





Current Medications








 Medications


  (Trade)  Dose


 Ordered  Sig/Roseline


 Route  Start Time


 Stop Time Status Last Admin


 


  (NS Flush)  2 ml  UNSCH  PRN


 IV FLUSH  2/3/18 10:00


     


 


 


  (NS Flush)  2 ml  BID


 IV FLUSH  2/3/18 21:00


    18 21:01


 


 


  (Tylenol)  650 mg  Q6H  PRN


 PO  2/3/18 10:00


     


 


 


  (Duoneb Neb)  1 ampule  Q4HR NEB  PRN


 INH  2/3/18 10:00


     


 


 


 Miscellaneous


 Information  1  Q361D


 XX  2/3/18 10:00


     


 


 


  (Chlorhexidine


 2% Cloth)  3 pack


 Taper  DAILY@04


 TOP  18 04:00


 19 03:59   


 


 


  (Chlorhexidine


 2% Cloth)  3 pack  UNSCH  PRN


 TOP  2/3/18 10:00


     


 


 


  (Dia-Colace)  1 tab  BID


 PO  2/3/18 21:00


    18 20:59


 


 


  (Milk Of


 Magnesia Liq)  30 ml  Q12H  PRN


 PO  2/3/18 10:00


     


 


 


  (Senokot)  17.2 mg  Q12H  PRN


 PO  2/3/18 10:00


     


 


 


  (Dulcolax Supp)  10 mg  DAILY  PRN


 RECTAL  2/3/18 10:00


     


 


 


  (Lactulose Liq)  30 ml  DAILY  PRN


 PO  2/3/18 10:00


     


 


 


  (Tylenol)  650 mg  Q4H  PRN


 PO  2/3/18 11:30


     


 


 


  (Proair Hfa Inh)  2 puff  Q4HR  PRN


 INH  2/3/18 11:30


     


 


 


  (Norvasc)  5 mg  DAILY


 PO  18 09:00


    18 09:14


 


 


  (Ecotrin Ec)  81 mg  DAILY


 PO  2/3/18 11:30


    18 09:13


 


 


  (Lipitor)  10 mg  HS


 PO  2/3/18 21:00


    18 20:59


 


 


  (Cleocin)  300 mg  TID


 PO  2/3/18 13:00


    18 16:59


 


 


  (Vitamin B12)  1,000 mcg  DAILY


 PO  18 09:00


    18 09:15


 


 


  (Pepcid)  20 mg  BID


 PO  2/3/18 21:00


    18 21:00


 


 


  (Folate)  1 mg  DAILY


 PO  18 09:00


    18 09:13


 


 


  (Levemir Inj)  7 units  HS


 SQ  2/3/18 21:00


    18 21:00


 


 


  (Duoneb Neb)  1 ampule  Q6HR  NEB


 INH  2/3/18 16:00


    18 07:35


 


 


  (Synthroid)  25 mcg  DAILY@0600


 PO  18 06:00


    18 06:21


 


 


  (Rheumatrex)  10 mg  Q7D


 PO  2/3/18 13:00


    2/3/18 12:54


 


 


  (Miralax)  17 gm  DAILY


 PO  18 09:00


    18 09:16


 


 


  (Zofran  Odt)  4 mg  Q4H  PRN


 PO  2/3/18 12:15


     


 


 


  (NovoLOG


 SUPPLEMENTAL


 SCALE)  1  ACHS


 SQ  2/3/18 12:00


    18 21:02


 


 


  (D50w (Vial) Inj)  25 ml  UNSCH  PRN


 IV  2/3/18 11:45


     


 


 


  (Glucagon Inj)  1 mg  UNSCH  PRN


 IM/SQ  2/3/18 11:45


     


 


 


  (Halls Jaclyn)  1 lozenge  UNSCH  PRN


 BUCCAL  18 13:15


    18 14:52


 


 


  (Eliquis)  2.5 mg  BID


 PO  18 21:00


    18 21:00


 


 


  (SEROquel)  25 mg  Q12HR  PRN


 PO  18 09:00


     


 








Family History


The patient's father reportedly had COPD, and his mother  of cancer.  The 

patient's son has cardiac issues followed by a cardiologist, and obesity (he 

says he weighs >400 lbs).


.


Substance Use


Tobacco: Smoked for many years but reportedly quit about 40 years ago


Alcohol: Records indicate former heavy alcohol consumption but none in recent 

years


Prescription med abuse: None


Illicits: None


.


Psychosocial History


The patient reports he was born and raised in [place], but moved to 

[place] more than 40 years ago.  He has been living with his son Asa in 

[place].





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





He was ,  in 





The patient reports he has 3 sons: He lives with Asa in [place], son Stanford 

lives in [facility], and son Aren lives in [place].


.


Spiritual/Cultural Factors


The patient reports he is not spiritual or Baptist, and he does not desire 

 visits.


.


Living Will:  Completed, but not made available (patient claims he has a living 

will "at home")


Health Care Surrogate:  Completed, but not made available


Durable Power of :  Never completed


Health Care Surrogate(s):


Son Asa Stone


.


Documented care wishes:


The patient reportedly has a living will, but the son is not sure where it is.


.


Today's verbally stated goals:


The patient states "I just want to get better," but it is difficult to know how 

much she understands of the discussion regarding his recent decline in complex 

medical issues.


.


Family/friends goals:


The patient and his son are hoping the patient can return to [facility] soon and 

perhaps improve with therapy.  The patient's son understands that the patient 

has been declining in recent weeks and that that decline may continue; he 

understands he may see further complications as the confusion and weakness 

worsened.


.


Ethical and Legal Issues


There are no ethical issues that would impact his care or decision-making at 

this time.





I do not believe the patient has capacity for independent medical decision-

making at the time of my evaluation; it is clear that his son Asa, with whom 

he lives, has been functioning as decision-maker, and Asa confirms that he is 

the [facility].


.





Physical Exam





Vital Signs








  Date Time  Temp Pulse Resp B/P (MAP) Pulse Ox O2 Delivery O2 Flow Rate FiO2


 


18 07:38      Nasal Cannula 2.00 


 


18 06:06 98.3 75 18 178/84 (115) 93   


 


18 05:37     93 Room Air  


 


18 01:55 98.1 85 18 188/82 (117) 93   


 


18 20:48 97.7 74 17 155/71 (99) 94   


 


18 16:00 98.5 73 19 142/65 (90) 98   


 


18 15:16     95 Nasal Cannula 2.00 


 


18 14:09     95 Nasal Cannula 2.00 








Exam


CONSTITUTIONAL/GENERAL: This is an adequately nourished patient, in no apparent 

distress but with intermittent agitation.


TUBES/LINES/DRAINS: 4-point restraints


SKIN: No jaundice, rashes, or lesions. Ecchymoses on upper extremities. No 

wounds seen anteriorly. Skin temperature appropriate. Not diaphoretic. 


HEAD: Atraumatic. Normocephalic.


EYES: Pupils equal and round and reactive. Extraocular motions intact. No 

scleral icterus. No injection or drainage. Fundi not examined.


ENT: Hearing somewhat diminished. Nose without bleeding or purulent drainage. 

Throat without visible erythema, exudates, masses, or lesions.


NECK: Trachea midline. Supple, nontender. No palpable thyroid enlargement or 

nodularity. 


CARDIOVASCULAR: Regular rate and rhythm without murmurs, gallops, or rubs. No 

JVD. Peripheral pulses diminished.


RESPIRATORY/CHEST: Symmetric, unlabored respirations. Clear to auscultation. 

Breath sounds equal bilaterally. No wheezes, rales, or rhonchi.  


GASTROINTESTINAL: Abdomen soft, non-tender, nondistended. No hepato-splenomegaly

, or palpable masses. No guarding. Bowel sounds present.


GENITOURINARY: Without palpable bladder distension.


MUSCULOSKELETAL: Extremities without clubbing, cyanosis, or edema. No joint 

tenderness or effusion noted. No calf tenderness. No mottling or clubbing.


LYMPHATICS: No palpable cervical or supraclavicular adenopathy.


NEUROLOGICAL: Awake and alert. Motor and sensory grossly within normal limits. 

Follows simple commands.  Not oriented to place, day, or year. Moves all 

extremities.


PSYCHIATRIC: No obvious anxiety/depression. no apparent hallucinations or other 

psychotic thought process.


.





Diagnostic Tests


Laboratory





Laboratory Tests








Test


  2/3/18


08:19 18


04:00


 


Triglycerides Level


  90 MG/DL


() 


 


 


Cholesterol Level


  125 MG/DL


(120-200) 


 


 


LDL Cholesterol


  54 MG/DL


(0-99) 


 


 


HDL Cholesterol


  53.1 MG/DL


(40.0-60.0) 


 


 


Cholesterol/HDL Ratio 2.35 RATIO  


 


Vitamin B12 Level


  650 PG/ML


(193-986) 


 


 


Thyroid Stimulating Hormone


3rd Gen 2.950 uIU/ML


(0.358-3.740) 


 


 


White Blood Count


  


  5.5 TH/MM3


(4.0-11.0)


 


Red Blood Count


  


  3.77 MIL/MM3


(4.50-5.90)


 


Hemoglobin


  


  11.7 GM/DL


(13.0-17.0)


 


Hematocrit


  


  34.7 %


(39.0-51.0)


 


Mean Corpuscular Volume


  


  91.9 FL


(80.0-100.0)


 


Mean Corpuscular Hemoglobin


  


  31.1 PG


(27.0-34.0)


 


Mean Corpuscular Hemoglobin


Concent 


  33.8 %


(32.0-36.0)


 


Red Cell Distribution Width


  


  16.2 %


(11.6-17.2)


 


Platelet Count


  


  223 TH/MM3


(150-450)


 


Mean Platelet Volume


  


  7.8 FL


(7.0-11.0)


 


Neutrophils (%) (Auto)


  


  57.5 %


(16.0-70.0)


 


Lymphocytes (%) (Auto)


  


  20.6 %


(9.0-44.0)


 


Monocytes (%) (Auto)


  


  10.3 %


(0.0-8.0)


 


Eosinophils (%) (Auto)


  


  11.1 %


(0.0-4.0)


 


Basophils (%) (Auto)


  


  0.5 %


(0.0-2.0)


 


Neutrophils # (Auto)


  


  3.2 TH/MM3


(1.8-7.7)


 


Lymphocytes # (Auto)


  


  1.1 TH/MM3


(1.0-4.8)


 


Monocytes # (Auto)


  


  0.6 TH/MM3


(0-0.9)


 


Eosinophils # (Auto)


  


  0.6 TH/MM3


(0-0.4)


 


Basophils # (Auto)


  


  0.0 TH/MM3


(0-0.2)


 


CBC Comment  DIFF FINAL 


 


Differential Comment   


 


Blood Urea Nitrogen


  


  17 MG/DL


(7-18)


 


Creatinine


  


  0.92 MG/DL


(0.60-1.30)


 


Random Glucose


  


  105 MG/DL


()


 


Total Protein


  


  6.8 GM/DL


(6.4-8.2)


 


Albumin


  


  2.5 GM/DL


(3.4-5.0)


 


Calcium Level


  


  8.3 MG/DL


(8.5-10.1)


 


Alkaline Phosphatase


  


  88 U/L


()


 


Aspartate Amino Transf


(AST/SGOT) 


  21 U/L (15-37) 


 


 


Alanine Aminotransferase


(ALT/SGPT) 


  16 U/L (12-78) 


 


 


Total Bilirubin


  


  0.5 MG/DL


(0.2-1.0)


 


Sodium Level


  


  139 MEQ/L


(136-145)


 


Potassium Level


  


  3.8 MEQ/L


(3.5-5.1)


 


Chloride Level


  


  101 MEQ/L


()


 


Carbon Dioxide Level


  


  33.0 MEQ/L


(21.0-32.0)


 


Anion Gap  5 MEQ/L (5-15) 


 


Estimat Glomerular Filtration


Rate 


  77 ML/MIN


(>89)








Result Diagram:  


18 0400                                                                    

            18 0400





Procedures


Recent pacemaker 18


.





Patient/Family Conference


Present at Family Conference:


Initially the patient and I in his room, and then son Asa by telephone


.


Family Conference Time (mins):  56


Family Conference Location:  Bedside, Telephone


Issues Discussed:


* Palliative care role, purpose, approach


* Additional medical, psychosocial, and spiritual history


* Patients general health, functional status, and cognitive changes in the 

months leading up to the current hospitalization


* Patient/family understanding of the current medical problems


* Patient/family understanding of prognosis


* Patients goals of care as best understood from advance directives and/or 

conversations and/or values


* Current medical treatment options and benefits/burdens of those options


* Likely scenarios comparing ongoing aggressive care with a transition to 

comfort measures only


* Questions answered to the best of my ability


* Palliative care contact information provided


The patient and his son are hoping the patient can return to [place] soon and 

perhaps improve with therapy.  The patient's son understands that the patient 

has been declining in recent weeks and that that decline may continue; he 

understands he may see further complications as the confusion and weakness 

worsened.


.





Assessment and Plan


Disease Oriented Problem List:  


(1) altered mental status, recent TIA, history of dementia


(2) recent progression of confusion and dementia symptoms


(3) probable TIA 2/3/18


(4) recent syncope, recent pacemaker placement, history of paroxysmal atrial 

fibrillation during this hospitalization


(5) intermittent atrial fibrillation 18


(6) COPD


(7) diabetes


(8) history of pneumonia


(9) hypertension


(10) hyperlipidemia


(11) chronic hearing loss


(12) rheumatoid arthritis


(13) hypothyroid


(14) history of prostate cancer


Symptom Scale:  


(1) agitation


0-10 Scale:  3 (he has been placed in restraints here at the hospital)





(2) altered mental status


0-10 Scale:  Unable to quantify (possibly due to progression of his dementia)





Pertinent Non-Medical Issues


Psychosocial: Originally from [place], and [place] for many years.  Lives 

with son Asa.  Retired 


Spiritual: Not spiritual or Baptist, does not want  visit


Legal: I do not believe the patient has capacity for independent medical 

decision-making at the time of my evaluation; it appears that his son Asa, 

with whom he lives, has been functioning as decision-maker.


Ethical issues impacting care: None


.


Important Contacts


Son: Asa Stone [phone]


.


Prognosis


The patient has been declining in recent weeks and months, and has been in and 

out of the hospital multiple times during that timeframe.  His dementia 

symptoms seem to be progressive.  Overall, his prognosis is guarded


.


Code Status:  Full Code


Plan


* FULL CODE: The patient's son says he would want cardiac resuscitation 

attempted, but that the patient "would not want to be kept on life support 

machines very long."


* DECISION-MAKING:  I do not believe the patient has capacity for independent 

medical decision-making at the time of my evaluation; it appears that his son 

Asa, with whom he lives, has been functioning as decision-maker.


* SYMPTOMS: The patient denies pain or dyspnea at the time I am seeing him.  He 

has some agitation and considerable confusion, and is in 4. restraints at the 

time of my initial visit.  Seroquel has been recommended by neurology, and the 

patient is being considered for anticoagulation


* GOALS: The patient and his son are hoping the patient can return to [place] 

soon and perhaps improve again with therapy.  The patient's son understands 

that the patient has been declining in recent weeks and that that decline may 

likely continue; he understands he may see further complications as the 

confusion and weakness worsened, and he is aware that decisions about 

aggressive care versus transition to comfort care will be faced in the upcoming 

weeks or months.


* Palliative Care will continue to follow the patient during this 

hospitalization.


.





Time Spent


Total Floor Time (mins):  79


Face to Face Time (mins):  61


>50% Counseling/Coord of Care:  Yes (d/w RN)





Thank you for the opportunity to participate in the care of Mr. Stone.





Attestation


To help prompt me to consider important information that might be impacting 

today's encounter and assessment, information from prior notes written by 

myself or my colleagues may have been "brought forward" into today's note.  My 

signature on this note, however, is an attestation that I personally performed 

the exam, history, and/or decision-making noted today, and, unless otherwise 

indicated, the interactions with patient, family, and staff as well as the 

review of records all occurred today.  I also attest that the listed assessment 

and stated plan reflect my best clinical judgment today based on the 

combination of historical information, prior notes, and today's exam/ 

interactions.  When time spent is documented, it refers only to time spent 

today by the signer, or if indicated, combined time spent today by 

collaborating physician/nurse practitioner.











Kelly Contreras MD 2018 11:25